# Patient Record
Sex: FEMALE | Race: WHITE | NOT HISPANIC OR LATINO | Employment: OTHER | URBAN - METROPOLITAN AREA
[De-identification: names, ages, dates, MRNs, and addresses within clinical notes are randomized per-mention and may not be internally consistent; named-entity substitution may affect disease eponyms.]

---

## 2017-01-06 DIAGNOSIS — E11.9 TYPE 2 DIABETES MELLITUS WITHOUT COMPLICATION: ICD-10-CM

## 2017-01-24 ENCOUNTER — TELEPHONE (OUTPATIENT)
Dept: FAMILY MEDICINE | Facility: CLINIC | Age: 82
End: 2017-01-24

## 2017-01-24 DIAGNOSIS — R26.81 UNSTEADY GAIT: Primary | ICD-10-CM

## 2017-01-24 NOTE — TELEPHONE ENCOUNTER
Pt dtr request a order for physical therapy sent to [facility]  Request it for balance and strengthening    Fax to [phone]

## 2017-01-24 NOTE — TELEPHONE ENCOUNTER
----- Message from Leanna Ford sent at 1/24/2017  9:21 AM CST -----  Cynthia, daughter, #[phone], is requesting to speak with the nurse concerning the pt's physical therapy.

## 2017-02-10 ENCOUNTER — PATIENT OUTREACH (OUTPATIENT)
Dept: ADMINISTRATIVE | Facility: HOSPITAL | Age: 82
End: 2017-02-10

## 2017-02-10 NOTE — LETTER
February 10, 2017    Gwen Durbin  [address]  [place] [address]             [facility]  [address]  [place] [address]  Phone: [phone] Dear Ms. Durbin:    [facility] is committed to your overall health.  To help you get the most out of each of your visits, we will review your information to make sure you are up to date on all of your recommended tests and/or procedures.      Nathalia Dunlap MD has found that you may be due for   Health Maintenance Due   Topic    Pneumococcal     Influenza Vaccine     Hemoglobin A1c     Eye Exam    If you have had any of the above done at another facility, please bring the records or information with you so that your record at [facility] will be complete.    If you are currently taking medication, please bring it with you to your appointment for review.    We will be happy to assist you with scheduling any necessary appointments or you may contact the [facility] appointment desk at [phone] to schedule at your convenience.     Thank you for choosing [facility] for your healthcare needs,      Jerilyn MELVIN LPN Care Coordinator  Care Coordination Department  [facility]

## 2017-02-22 ENCOUNTER — TELEPHONE (OUTPATIENT)
Dept: FAMILY MEDICINE | Facility: CLINIC | Age: 82
End: 2017-02-22

## 2017-02-22 NOTE — TELEPHONE ENCOUNTER
----- Message from Anne Anthony sent at 2/22/2017 11:11 AM CST -----  Contact: pt's daughter Cynthia- [phone]  Pt 's daughter Cynthia requests pt to be worked in around 9:00 am on 03/07/2017. She states when she called in previously to schedule, she told the agent not to schedule on a Friday and pt appt was scheduled for 02/24/17. Cynthia can be reached at [phone].

## 2017-02-23 NOTE — TELEPHONE ENCOUNTER
dtr states pt was booked for a physical in January by the phone staff and was told that she had a apt on a Tuesday.  States she got the apt letter in the mail and it was scheduled for a Friday and she spefically ask them not to make a apt on a Friday.  Pt states she wants to be worked in for a morning apt for a physical because she needs the patient to fast for her labs.  Offered a 1130 apt. Pt dtr refused. Wants to be see at the end of Feb or the beginning of March in the early morning.  Can urgent slot be used?

## 2017-02-23 NOTE — TELEPHONE ENCOUNTER
----- Message from Lorenza Lloyd sent at 2/23/2017 11:39 AM CST -----  Contact: loli - Cynthia Ford  [phone]   is returning a missed call - Please call again

## 2017-03-07 ENCOUNTER — OFFICE VISIT (OUTPATIENT)
Dept: FAMILY MEDICINE | Facility: CLINIC | Age: 82
End: 2017-03-07
Payer: MEDICARE

## 2017-03-07 ENCOUNTER — LAB VISIT (OUTPATIENT)
Dept: LAB | Facility: HOSPITAL | Age: 82
End: 2017-03-07
Attending: FAMILY MEDICINE
Payer: MEDICARE

## 2017-03-07 VITALS
BODY MASS INDEX: 24.46 KG/M2 | WEIGHT: 146.81 LBS | RESPIRATION RATE: 16 BRPM | HEART RATE: 69 BPM | OXYGEN SATURATION: 96 % | DIASTOLIC BLOOD PRESSURE: 70 MMHG | SYSTOLIC BLOOD PRESSURE: 110 MMHG | HEIGHT: 65 IN | TEMPERATURE: 97 F

## 2017-03-07 DIAGNOSIS — E11.69 HYPERLIPIDEMIA ASSOCIATED WITH TYPE 2 DIABETES MELLITUS: ICD-10-CM

## 2017-03-07 DIAGNOSIS — N18.30 CKD (CHRONIC KIDNEY DISEASE) STAGE 3, GFR 30-59 ML/MIN: ICD-10-CM

## 2017-03-07 DIAGNOSIS — M81.0 OSTEOPOROSIS, SENILE: ICD-10-CM

## 2017-03-07 DIAGNOSIS — I15.2 HYPERTENSION ASSOCIATED WITH DIABETES: ICD-10-CM

## 2017-03-07 DIAGNOSIS — H35.30 MACULAR DEGENERATION: ICD-10-CM

## 2017-03-07 DIAGNOSIS — E11.59 HYPERTENSION ASSOCIATED WITH DIABETES: ICD-10-CM

## 2017-03-07 DIAGNOSIS — E78.5 HYPERLIPIDEMIA ASSOCIATED WITH TYPE 2 DIABETES MELLITUS: ICD-10-CM

## 2017-03-07 DIAGNOSIS — F17.200 TOBACCO USE DISORDER: ICD-10-CM

## 2017-03-07 DIAGNOSIS — Z00.00 PREVENTATIVE HEALTH CARE: Primary | ICD-10-CM

## 2017-03-07 PROCEDURE — 99499 UNLISTED E&M SERVICE: CPT | Mod: S$GLB,,, | Performed by: FAMILY MEDICINE

## 2017-03-07 PROCEDURE — 82570 ASSAY OF URINE CREATININE: CPT

## 2017-03-07 PROCEDURE — 99397 PER PM REEVAL EST PAT 65+ YR: CPT | Mod: S$GLB,,, | Performed by: FAMILY MEDICINE

## 2017-03-07 PROCEDURE — 99999 PR PBB SHADOW E&M-EST. PATIENT-LVL III: CPT | Mod: PBBFAC,,, | Performed by: FAMILY MEDICINE

## 2017-03-07 NOTE — MR AVS SNAPSHOT
[facility]  [address] [address]  Phone: [phone]                  Gwen Durbin   3/7/2017 9:30 AM   Office Visit    Description:  Female : 10/6/1928   Provider:  Nathalia Dunlap MD   Department:  [facility] Medicine           Diagnoses this Visit        Comments    Preventative health care    -  Primary     Uncontrolled type 2 diabetes mellitus without complication, without long-term current use of insulin         CKD (chronic kidney disease) stage 3, GFR 30-59 ml/min         Hyperlipidemia associated with type 2 diabetes mellitus         Hypertension associated with diabetes         Uncontrolled secondary diabetes mellitus with stage 3 CKD (GFR 30-59)         Osteoporosis, senile         Macular degeneration                To Do List           Future Appointments        Provider Department Dept Phone    3/7/2017 10:20 AM SPECIMEN, [address] [facility] [phone]    3/7/2017 11:40 AM LABORATORY, [address] [facility] [phone]    3/14/2017 9:30 AM Nathalia Dunlap MD [facility] [phone]      Goals (5 Years of Data)     None      [facility] On [facility]     [facility] On Call Nurse Care Line -  Assistance  Registered nurses in the [facility] On Call Center provide clinical advisement, health education, appointment booking, and other advisory services.  Call for this free service at [phone].             Medications           Message regarding Medications     Verify the changes and/or additions to your medication regime listed below are the same as discussed with your clinician today.  If any of these changes or additions are incorrect, please notify your healthcare provider.             Verify that the below list of medications is an accurate representation of the medications you are currently taking.  If none reported, the list may be blank. If incorrect, please  "contact your healthcare provider. Carry this list with you in case of emergency.           Current Medications     blood sugar diagnostic (FREESTYLE INSULINX TEST STRIPS) Strp Check sugar 3 to 4 times daily.  Dx code:  E11.65    furosemide (LASIX) 20 MG tablet Take 1 tablet (20 mg total) by mouth 2 (two) times daily.    glimepiride (AMARYL) 2 MG tablet Take 1 tablet (2 mg total) by mouth before breakfast.    LANCETS & BLOOD GLUCOSE STRIPS MISC by Misc.(Non-Drug; Combo Route) route 2 (two) times daily.    lancets (FREESTYLE LANCETS) 28 gauge Misc 1 Units by Misc.(Non-Drug; Combo Route) route as directed. Check blood sugar 3 to 4 times daily.    metformin (GLUCOPHAGE) 500 MG tablet Take 1 tablet (500 mg total) by mouth 2 (two) times daily with meals.    potassium chloride (MICRO-K) 10 MEQ CpSR Take 1 capsule (10 mEq total) by mouth once daily.    simvastatin (ZOCOR) 40 MG tablet Take 1 tablet (40 mg total) by mouth every evening.    ergocalciferol (ERGOCALCIFEROL) 50,000 unit Cap Take 1 capsule (50,000 Units total) by mouth every 7 days.    fish oil-omega-3 fatty acids 300-1,000 mg capsule Take 2 g by mouth.    lisinopril 10 MG tablet Take 1 tablet (10 mg total) by mouth once daily.    meloxicam (MOBIC) 7.5 MG tablet Take 1 tablet (7.5 mg total) by mouth once daily.           Clinical Reference Information           Your Vitals Were     BP Pulse Temp Resp Height Weight    110/70 69 96.5 °F (35.8 °C) (Tympanic) 16 5' 4.5" (1.638 m) 66.6 kg (146 lb 13.2 oz)    SpO2 BMI             96% 24.81 kg/m2         Blood Pressure          Most Recent Value    BP  110/70      Allergies as of 3/7/2017     No Known Allergies      Immunizations Administered on Date of Encounter - 3/7/2017     None      Orders Placed During Today's Visit     Future Labs/Procedures Expected by Expires    CBC auto differential  3/7/2017 5/6/2018    Comprehensive metabolic panel  3/7/2017 5/6/2018    Lipid panel  3/7/2017 5/6/2018    " Microalbumin/creatinine urine ratio  3/7/2017 5/6/2018    TSH  3/7/2017 5/6/2018      [facility] Sign-Up     Activating your [facility] account is as easy as 1-2-3!     1) Visit [email or web address], select Sign Up Now, enter this activation code and your date of birth, then select Next.  [email or web address]  Expires: 4/21/2017 10:14 AM      2) Create a username and password to use when you visit [facility] in the future and select a security question in case you lose your password and select Next.    3) Enter your e-mail address and click Sign Up!    Additional Information  If you have questions, please e-mail [email or web address] or call [phone] to talk to our [facility] staff. Remember, [facility] is NOT to be used for urgent needs. For medical emergencies, dial 911.         Instructions      MIRALAX FOR CONSTIPATION       Smoking Cessation     If you would like to quit smoking:   You may be eligible for free services if you are a [place] resident and started smoking cigarettes before September 1, 1988.  Call the Smoking Cessation Trust ([facility]) toll free at (264) [phone] or (188) [phone].   Call 8-510-QUIT-NOW if you do not meet the above criteria.            Language Assistance Services     ATTENTION: Language assistance services are available, free of charge. Please call [phone].      ATENCIÓN: Si habla español, tiene a jordan disposición servicios gratuitos de asistencia lingüística. Llame al [phone].     [facility] Ý: N?u b?n nói Ti?ng Vi?t, có các d?ch v? h? tr? ngôn ng? mi?n phí dành cho b?n. G?i s? [phone].         [facility] complies with applicable Federal civil rights laws and does not discriminate on the basis of race, color, national origin, age, disability, or sex.

## 2017-03-07 NOTE — PROGRESS NOTES
CHIEF COMPLAINT: This is an 88-year-old female here for preventive health exam.    SUBJECTIVE: Patient is doing well without complaints except for intermittent constipation and diarrhea.  She occasionally uses Dulcolax.  She denies bright red blood per rectum.  Patient complains of painful right great toenail.  She denies redness, swelling or warmth.  She continues to live in assisted living at [facility]. She ambulates daily with her walker. Her eyesight continues to deteriorate due to macular degeneration. Her blood sugars are 140-160. She denies polyuria, polydipsia or polyphagia. Last A1c was 6.7%, 8 months ago. She complains of intermittent constipation and uses ducolax as needed. She denies blood in stool or melena. She continues to smoke one half pack of cigarettes per day and has done so for years.     Eye exam January 2017. Mammogram 2008. Colonoscopy May 2011. Bone DEXA scan July 2013 (patient refuses). Pneumovax May 2011. Flu vaccine October 2016. Prevnar April 2015.     ROS:  GENERAL: Patient denies fever, chills, night sweats. Patient denies weight gain. Patient denies anorexia, fatigue, weakness or swollen glands.  SKIN: Patient denies rash or hair loss.  HEENT: Patient denies sore throat, ear pain, hearing loss, nasal congestion, or runny nose. Patient denies visual disturbance, eye irritation or discharge.  LUNGS: Patient denies cough, wheeze or hemoptysis.  CARDIOVASCULAR: Patient denies chest pain, shortness of breath, palpitations, syncope or lower extremity edema.  GI: Patient denies abdominal pain, nausea, vomiting, diarrhea, blood in stool or melena.  GENITOURINARY: Patient denies pelvic pain, vaginal discharge, itch or odor. Patient denies irregular vaginal bleeding. Patient denies dysuria, frequency, hematuria, nocturia, urgency or incontinence.  BREASTS: Patient denies breast pain, mass or nipple discharge.  MUSCULOSKELETAL: Patient denies joint pain, swelling, redness or  warmth.  NEUROLOGIC: Patient denies headache, vertigo, paresthesias, weakness in limb, dysarthria, dysphagia or abnormality of gait.  PSYCHIATRIC: Patient denies anxiety, depression, or memory loss.     OBJECTIVE:   GENERAL: Well-developed well-nourished elderly white female alert and oriented x3, in no acute distress. Memory, judgment and cognition without deficit.  Weight loss of 3 pounds in the last year.  SKIN: Clear without rash. Normal color and tone.  HEENT: Eyes: Clear conjunctivae. No scleral icterus. Pupils equal reactive to light and accommodation. Ears: Clear canals. Clear TMs. Nose: Without congestion. Pharynx: Without injection or exudates.  NECK: Supple, normal range of motion. No masses, lymphadenopathy or enlarged thyroid. No JVD. Carotids 2+ and equal. No bruits.  LUNGS: Clear to auscultation. Normal respiratory effort.  CARDIOVASCULAR: Regular rhythm, normal S1, S2 without murmur, gallop or rub.  BACK: No CVA or spinal tenderness.  BREASTS: No masses, tenderness or nipple discharge.  ABDOMEN: Normal appearance. Active bowel sounds. Soft, nontender without mass or organomegaly. No rebound or guarding.  EXTREMITIES: Without cyanosis, clubbing. Trace ankle/pedal edema bilaterally. Distal pulses 2+ and equal. Normal range of motion in all extremities. No joint effusion, erythema or warmth.  NEUROLOGIC: Cranial nerves II through XII without deficit. Motor strength equal bilaterally. Sensation normal to touch. Deep tendon reflexes 2+ and equal. Gait unsteady without walker. No tremor. Negative cerebellar signs.   FOOT EVALUATION: 10 gram monofilament exam with protective sensation intact bilaterally. Nails appropriately trimmed. No ulcers. Distal pulses palpable.  Right great toenail ingrown medially with tenderness to palpation.  No paronychial erythema, fluctuance or warmth.  PELVIC: Deferred.  JUSTIN: No masses nontender heme-negative stool ×2.     ASSESSMENT:  1. Preventative health care    2.  Uncontrolled type 2 diabetes mellitus without complication, without long-term current use of insulin    3. CKD (chronic kidney disease) stage 3, GFR 30-59 ml/min    4. Hyperlipidemia associated with type 2 diabetes mellitus    5. Hypertension associated with diabetes    6. Uncontrolled secondary diabetes mellitus with stage 3 CKD (GFR 30-59)    7. Osteoporosis, senile    8. Macular degeneration    9. Tobacco use disorder      PLAN:   1.  Exercise regularly.  2.  Age-appropriate counseling.  3.  Fasting lab.  4.  Patient declines mammogram and bone DEXA scan.  5.  MiraLAX as needed for constipation.  6.  Return to clinic for removal of ingrown toenail.

## 2017-03-08 LAB
CREAT UR-MCNC: 277 MG/DL
MICROALBUMIN UR DL<=1MG/L-MCNC: 67 UG/ML
MICROALBUMIN/CREATININE RATIO: 24.2 UG/MG

## 2017-03-14 ENCOUNTER — OFFICE VISIT (OUTPATIENT)
Dept: FAMILY MEDICINE | Facility: CLINIC | Age: 82
End: 2017-03-14
Payer: MEDICARE

## 2017-03-14 VITALS
SYSTOLIC BLOOD PRESSURE: 122 MMHG | DIASTOLIC BLOOD PRESSURE: 78 MMHG | HEIGHT: 65 IN | RESPIRATION RATE: 18 BRPM | HEART RATE: 69 BPM | TEMPERATURE: 97 F | WEIGHT: 147.5 LBS | BODY MASS INDEX: 24.57 KG/M2

## 2017-03-14 DIAGNOSIS — L60.0 INGROWN TOENAIL: Primary | ICD-10-CM

## 2017-03-14 PROCEDURE — 1159F MED LIST DOCD IN RCRD: CPT | Mod: S$GLB,,, | Performed by: FAMILY MEDICINE

## 2017-03-14 PROCEDURE — 99212 OFFICE O/P EST SF 10 MIN: CPT | Mod: 25,S$GLB,, | Performed by: FAMILY MEDICINE

## 2017-03-14 PROCEDURE — 1157F ADVNC CARE PLAN IN RCRD: CPT | Mod: S$GLB,,, | Performed by: FAMILY MEDICINE

## 2017-03-14 PROCEDURE — 1160F RVW MEDS BY RX/DR IN RCRD: CPT | Mod: S$GLB,,, | Performed by: FAMILY MEDICINE

## 2017-03-14 PROCEDURE — 1126F AMNT PAIN NOTED NONE PRSNT: CPT | Mod: S$GLB,,, | Performed by: FAMILY MEDICINE

## 2017-03-14 PROCEDURE — 11730 AVULSION NAIL PLATE SIMPLE 1: CPT | Mod: S$GLB,,, | Performed by: FAMILY MEDICINE

## 2017-03-14 PROCEDURE — 99999 PR PBB SHADOW E&M-EST. PATIENT-LVL III: CPT | Mod: PBBFAC,,, | Performed by: FAMILY MEDICINE

## 2017-03-14 NOTE — PROGRESS NOTES
CHIEF COMPLAINT: This is a 88-year-old female complaining of ingrown toenail.    SUBJECTIVE: Patient complains of right ingrown toenail, lateral surface for days to weeks with pain when she puts on shoe and pain with ambulation.  She denies swelling, redness or warmth.  She denies fever, chills.    ROS:  GENERAL: Patient denies fever, chills, night sweats.  Patient denies weight gain or loss. Patient denies anorexia, fatigue, weakness or swollen glands.  SKIN: Patient denies rash.  LUNGS: Patient denies cough, wheeze or hemoptysis.  CARDIOVASCULAR: Patient denies chest pain, shortness of breath, palpitations, syncope or lower extremity edema.  GI: Patient denies abdominal pain, nausea, vomiting, diarrhea, constipation, blood in stool or melena.    OBJECTIVE:   GENERAL: Well-developed well-nourished elderly white female alert and oriented x3, in no acute distress. Memory, judgment and cognition without deficit.   SKIN: Clear without rash. Normal color and tone.  HEENT: Eyes: Clear conjunctivae. No scleral icterus.   NECK: Supple, normal range of motion. No masses, lymphadenopathy or enlarged thyroid. No JVD. Carotids 2+ and equal. No bruits.  LUNGS: Clear to auscultation. Normal respiratory effort.  CARDIOVASCULAR: Regular rhythm, normal S1, S2 without murmur, gallop or rub.  EXTREMITIES: Without cyanosis, clubbing. Trace ankle/pedal edema bilaterally. Distal pulses 2+ and equal. Normal range of motion in all extremities. No joint effusion, erythema or warmth.  Right great toenail ingrown medially with tenderness to palpation. No paronychial erythema, fluctuance or warmth.    Discussed potential complications of procedure including bleeding, infection, and injury to nerve.  Patient understands and accepts risks.  Verbal consent obtained.    Procedure: After sterile prep, right great toe anesthetized with 1% Xylocaine in digital block.  Toenail scored with #15 blade and bisected with nail clippers.  One third lateral  nail removed with hemostats.  Bleeding controlled.  Dressing placed.    ASSESSMENT:  1. Ingrown toenail      PLAN:   1.  Local care instructions.  2.  OTC analgesics as needed for pain.  3.  Follow-up if fever, chills, redness, swelling, or purulent discharge.

## 2017-05-08 DIAGNOSIS — Z00.00 PREVENTATIVE HEALTH CARE: ICD-10-CM

## 2017-05-08 DIAGNOSIS — E11.59 HYPERTENSION ASSOCIATED WITH DIABETES: ICD-10-CM

## 2017-05-08 DIAGNOSIS — M15.9 PRIMARY OSTEOARTHRITIS INVOLVING MULTIPLE JOINTS: ICD-10-CM

## 2017-05-08 DIAGNOSIS — I15.2 HYPERTENSION ASSOCIATED WITH DIABETES: ICD-10-CM

## 2017-05-08 DIAGNOSIS — E11.69 HYPERLIPIDEMIA ASSOCIATED WITH TYPE 2 DIABETES MELLITUS: ICD-10-CM

## 2017-05-08 DIAGNOSIS — N18.30 CKD (CHRONIC KIDNEY DISEASE) STAGE 3, GFR 30-59 ML/MIN: ICD-10-CM

## 2017-05-08 DIAGNOSIS — E78.5 HYPERLIPIDEMIA ASSOCIATED WITH TYPE 2 DIABETES MELLITUS: ICD-10-CM

## 2017-05-08 DIAGNOSIS — F17.200 TOBACCO USE DISORDER: ICD-10-CM

## 2017-05-08 DIAGNOSIS — H35.3290: ICD-10-CM

## 2017-05-08 NOTE — TELEPHONE ENCOUNTER
----- Message from Lidia Cleveland sent at 5/8/2017  4:20 PM CDT -----  Contact: Cynthia/daughter   Call caller regarding pt requesting a script for test scripts. Caller states that pt is almost out.   [phone]      ..  [facility] [address]

## 2017-05-11 ENCOUNTER — TELEPHONE (OUTPATIENT)
Dept: FAMILY MEDICINE | Facility: CLINIC | Age: 82
End: 2017-05-11

## 2017-05-11 RX ORDER — INSULIN PUMP SYRINGE, 3 ML
EACH MISCELLANEOUS
Qty: 1 EACH | Refills: 0 | Status: SHIPPED | OUTPATIENT
Start: 2017-05-11 | End: 2017-08-08

## 2017-05-11 NOTE — TELEPHONE ENCOUNTER
----- Message from Julia De La Rosa sent at 5/11/2017  9:11 AM CDT -----  Contact: Patients daughter, Cynthia Marie states that the test strips for her glucose, and insurance will not cover Freestyle, and needs another type of monitor and strips called in. Please call her back at [phone]. Thank you

## 2017-05-11 NOTE — TELEPHONE ENCOUNTER
----- Message from Lidia Cleveland sent at 5/11/2017  2:05 PM CDT -----  Contact: pt   Pt states that this is the name of glucose machine that her insurance will cover is In[employer]k Guide.. Pt states that she would like this machine called in to the pharmacy below.       ...[phone]      ..ÁNGELA HAYES

## 2017-06-23 ENCOUNTER — TELEPHONE (OUTPATIENT)
Dept: FAMILY MEDICINE | Facility: CLINIC | Age: 82
End: 2017-06-23

## 2017-06-23 RX ORDER — BUPROPION HYDROCHLORIDE 150 MG/1
TABLET, EXTENDED RELEASE ORAL
Qty: 60 TABLET | Refills: 5 | Status: SHIPPED | OUTPATIENT
Start: 2017-06-23 | End: 2017-08-08 | Stop reason: SDUPTHER

## 2017-06-23 NOTE — TELEPHONE ENCOUNTER
----- Message from Ariadne Vicente sent at 6/23/2017  8:20 AM CDT -----  Contact: Cynthiareshma Ford - daughter  Call her regarding patient's medication.  Call at [phone].                                                     briseno

## 2017-06-23 NOTE — TELEPHONE ENCOUNTER
Pt dtr states the facility where the pt stays told the pt she needed to stop smoking  dtr wants to know if you would order wellbutrin to help her with this

## 2017-07-03 ENCOUNTER — TELEPHONE (OUTPATIENT)
Dept: FAMILY MEDICINE | Facility: CLINIC | Age: 82
End: 2017-07-03

## 2017-07-03 NOTE — TELEPHONE ENCOUNTER
----- Message from Danuta Paz sent at 7/3/2017 11:27 AM CDT -----  Contact: Bethany/[facility]  Bethany called to speak with the nurse about a fax to get signed regarding therapy orders. They have been sending since March and never received back.    Fax number: [phone]    She can be contacted at [phone]    Thanks,  Danuta

## 2017-08-08 ENCOUNTER — OFFICE VISIT (OUTPATIENT)
Dept: FAMILY MEDICINE | Facility: CLINIC | Age: 82
End: 2017-08-08
Payer: MEDICARE

## 2017-08-08 VITALS
DIASTOLIC BLOOD PRESSURE: 68 MMHG | HEIGHT: 65 IN | OXYGEN SATURATION: 97 % | SYSTOLIC BLOOD PRESSURE: 118 MMHG | BODY MASS INDEX: 22.77 KG/M2 | HEART RATE: 60 BPM | WEIGHT: 136.69 LBS

## 2017-08-08 DIAGNOSIS — H91.93 BILATERAL HEARING LOSS, UNSPECIFIED HEARING LOSS TYPE: ICD-10-CM

## 2017-08-08 DIAGNOSIS — F17.200 TOBACCO USE DISORDER: ICD-10-CM

## 2017-08-08 DIAGNOSIS — E11.59 HYPERTENSION ASSOCIATED WITH DIABETES: ICD-10-CM

## 2017-08-08 DIAGNOSIS — N18.30 CKD (CHRONIC KIDNEY DISEASE) STAGE 3, GFR 30-59 ML/MIN: ICD-10-CM

## 2017-08-08 DIAGNOSIS — E55.9 VITAMIN D DEFICIENCY: ICD-10-CM

## 2017-08-08 DIAGNOSIS — E78.5 HYPERLIPIDEMIA ASSOCIATED WITH TYPE 2 DIABETES MELLITUS: ICD-10-CM

## 2017-08-08 DIAGNOSIS — E11.69 HYPERLIPIDEMIA ASSOCIATED WITH TYPE 2 DIABETES MELLITUS: ICD-10-CM

## 2017-08-08 DIAGNOSIS — E78.5 DYSLIPIDEMIA: ICD-10-CM

## 2017-08-08 DIAGNOSIS — Z00.00 ENCOUNTER FOR PREVENTIVE HEALTH EXAMINATION: Primary | ICD-10-CM

## 2017-08-08 DIAGNOSIS — N18.30 CONTROLLED TYPE 2 DIABETES MELLITUS WITH STAGE 3 CHRONIC KIDNEY DISEASE, WITHOUT LONG-TERM CURRENT USE OF INSULIN: ICD-10-CM

## 2017-08-08 DIAGNOSIS — M81.0 OSTEOPOROSIS, SENILE: ICD-10-CM

## 2017-08-08 DIAGNOSIS — H35.3290: ICD-10-CM

## 2017-08-08 DIAGNOSIS — R26.81 GAIT INSTABILITY: ICD-10-CM

## 2017-08-08 DIAGNOSIS — R60.0 LEG EDEMA: ICD-10-CM

## 2017-08-08 DIAGNOSIS — M15.9 PRIMARY OSTEOARTHRITIS INVOLVING MULTIPLE JOINTS: ICD-10-CM

## 2017-08-08 DIAGNOSIS — R60.0 LOWER EXTREMITY EDEMA: ICD-10-CM

## 2017-08-08 DIAGNOSIS — I15.2 HYPERTENSION ASSOCIATED WITH DIABETES: ICD-10-CM

## 2017-08-08 DIAGNOSIS — E11.22 CONTROLLED TYPE 2 DIABETES MELLITUS WITH STAGE 3 CHRONIC KIDNEY DISEASE, WITHOUT LONG-TERM CURRENT USE OF INSULIN: ICD-10-CM

## 2017-08-08 DIAGNOSIS — M54.9 MID BACK PAIN: ICD-10-CM

## 2017-08-08 PROCEDURE — 99999 PR PBB SHADOW E&M-EST. PATIENT-LVL IV: CPT | Mod: PBBFAC,,, | Performed by: NURSE PRACTITIONER

## 2017-08-08 PROCEDURE — G0439 PPPS, SUBSEQ VISIT: HCPCS | Mod: S$GLB,,, | Performed by: NURSE PRACTITIONER

## 2017-08-08 PROCEDURE — 99499 UNLISTED E&M SERVICE: CPT | Mod: S$GLB,,, | Performed by: NURSE PRACTITIONER

## 2017-08-08 RX ORDER — POTASSIUM CHLORIDE 750 MG/1
10 CAPSULE, EXTENDED RELEASE ORAL DAILY
Qty: 30 CAPSULE | Refills: 6 | Status: SHIPPED | OUTPATIENT
Start: 2017-08-08 | End: 2018-01-08 | Stop reason: SDUPTHER

## 2017-08-08 RX ORDER — FUROSEMIDE 20 MG/1
20 TABLET ORAL 2 TIMES DAILY
Qty: 30 TABLET | Refills: 6 | Status: SHIPPED | OUTPATIENT
Start: 2017-08-08 | End: 2017-10-21 | Stop reason: SDUPTHER

## 2017-08-08 RX ORDER — LISINOPRIL 10 MG/1
10 TABLET ORAL DAILY
Qty: 30 TABLET | Refills: 6 | Status: SHIPPED | OUTPATIENT
Start: 2017-08-08 | End: 2018-01-19 | Stop reason: SDUPTHER

## 2017-08-08 RX ORDER — BUPROPION HYDROCHLORIDE 150 MG/1
TABLET, EXTENDED RELEASE ORAL
Qty: 60 TABLET | Refills: 6 | Status: SHIPPED | OUTPATIENT
Start: 2017-08-08 | End: 2018-01-11 | Stop reason: SDUPTHER

## 2017-08-08 RX ORDER — MELOXICAM 7.5 MG/1
7.5 TABLET ORAL DAILY
Qty: 30 TABLET | Refills: 6 | Status: SHIPPED | OUTPATIENT
Start: 2017-08-08 | End: 2018-11-08

## 2017-08-08 RX ORDER — ERGOCALCIFEROL 1.25 MG/1
50000 CAPSULE ORAL
Qty: 4 CAPSULE | Refills: 6 | Status: SHIPPED | OUTPATIENT
Start: 2017-08-08 | End: 2018-11-08

## 2017-08-08 RX ORDER — SIMVASTATIN 40 MG/1
40 TABLET, FILM COATED ORAL NIGHTLY
Qty: 30 TABLET | Refills: 6 | Status: SHIPPED | OUTPATIENT
Start: 2017-08-08 | End: 2018-04-17 | Stop reason: SDUPTHER

## 2017-08-08 RX ORDER — GLIMEPIRIDE 2 MG/1
2 TABLET ORAL
Qty: 30 TABLET | Refills: 6 | Status: SHIPPED | OUTPATIENT
Start: 2017-08-08 | End: 2018-01-03 | Stop reason: SDUPTHER

## 2017-08-08 RX ORDER — METFORMIN HYDROCHLORIDE 500 MG/1
500 TABLET ORAL 2 TIMES DAILY WITH MEALS
Qty: 60 TABLET | Refills: 6 | Status: SHIPPED | OUTPATIENT
Start: 2017-08-08 | End: 2017-10-17

## 2017-08-08 NOTE — Clinical Note
Your patient was seen today for a HRA visit.  I have included a copy of my visit note, please review the note and feel free to contact me with any questions.  Thank you for allowing me to participate in the care of your patients.  Stacey Ford NP

## 2017-08-08 NOTE — PROGRESS NOTES
"Gwen Durbin presented for a  Medicare AWV and comprehensive Health Risk Assessment today. The following components were reviewed and updated:    · Medical history  · Family History  · Social history  · Allergies and Current Medications  · Health Risk Assessment  · Health Maintenance  · Care Team     ** See Completed Assessments for Annual Wellness Visit within the encounter summary.**       The following assessments were completed:  · Living Situation  · CAGE  · Depression Screening  · Timed Get Up and Go  · Whisper Test  · Cognitive Function Screening  · Nutrition Screening  · ADL Screening  · PAQ Screening    Vitals:    08/08/17 0901   BP: 118/68   BP Location: Left arm   Patient Position: Sitting   Pulse: 60   SpO2: 97%   Weight: 62 kg (136 lb 11 oz)   Height: 5' 4.5" (1.638 m)     Body mass index is 23.1 kg/m².  Physical Exam   Constitutional: She appears well-developed.   HENT:   Head: Normocephalic and atraumatic.   Eyes: Pupils are equal, round, and reactive to light.   Neck: Carotid bruit is not present.   Cardiovascular: Normal rate, regular rhythm, normal heart sounds, intact distal pulses and normal pulses.  Exam reveals no gallop.    No murmur heard.  Pulmonary/Chest: Effort normal and breath sounds normal.   Abdominal: Soft. Normal appearance and bowel sounds are normal. She exhibits no distension. There is no tenderness.   Musculoskeletal: Normal range of motion. She exhibits no edema or tenderness.   Neurological: She is alert. She exhibits normal muscle tone.   Sensory exam of the feet is normal, tested with the monofilament.     Ambulates with rollator   Skin: Skin is warm, dry and intact.   Psychiatric: She has a normal mood and affect. Her speech is normal and behavior is normal. Judgment and thought content normal. Cognition and memory are normal.   Nursing note and vitals reviewed.        Diagnoses and health risks identified today and associated recommendations/orders:    1. Encounter for " preventive health examination    2 Controlled type 2 diabetes mellitus with stage 3 chronic kidney disease, without long-term current use of insulin  Component      Latest Ref Rng & Units 3/7/2017 6/14/2016   Hemoglobin A1C      4.5 - 6.2 % 6.7 (H) 6.7 (H)   Stable and controlled on Glucophage and Amaryl daily  . Continue current treatment plan as previously prescribed with your PCP- Ivanna       3. CKD (chronic kidney disease) stage 3, GFR 30-59 ml/min  Component      Latest Ref Rng & Units 3/7/2017 6/14/2016 3/1/2016   eGFR if non African American      >60 mL/min/1.73 m:2 44.9 (A) 57.7 (A) 57.7 (A)   Stable and controlled. Continue current treatment plan as previously prescribed with your PCP.       4. Hyperlipidemia associated with type 2 diabetes mellitus    Component      Latest Ref Rng & Units 3/7/2017   Cholesterol      120 - 199 mg/dL 136   Triglycerides      30 - 150 mg/dL 146   HDL      40 - 75 mg/dL 42   LDL Cholesterol      63.0 - 159.0 mg/dL 64.8   HDL/Chol Ratio      20.0 - 50.0 % 30.9   Total Cholesterol/HDL Ratio      2.0 - 5.0 3.2   Stable and controlled on Zocor daily . Continue current treatment plan as previously prescribed with your PCP.       5. Hypertension associated with diabetes  Stable and controlled on Lisinopril daily . Continue current treatment plan as previously prescribed with your PCP.     6. Osteoporosis, senile  Chronic and stable - denies recent falls or injuries   last DEXA 2013 and decline additional DEXA. Takes vitamin D and calcium . Followed by outside rheuma logist, Dr. Caal.     7. Tobacco use disorder  Pt reports she smokes .5 ppd cigarettes daily and doesn't want to stopped   pt on long term of smoking and smoking cessation program- refused at this time   This problem is currently not controlled. Please follow up with your PCP as planned to discuss     8. Bilateral hearing loss, unspecified hearing loss type  This problem is currently not controlled. Pt states  "she does not wear hearing aides " It's too much trouble to put on " Please follow up with your PCP as planned to discuss adjustments to your treatment plan.    9. Exudative macular degeneration  Chronic and stable- states decrease vision but has not worsen . Followed by optholomogist.    10. Gait instability  Stable and controlled with a Rolator daily, goes to PT 2 twice week- denies recent falls or injuries . Continue current treatment plan as previously prescribed with your PCP and therapy       11. Primary osteoarthritis involving multiple joints  Stable and controlled on Mobic daily . Continue current treatment plan as previously prescribed with your PCP.     12. Vitamin D deficiency  Stable and controlled on Vitamin D 469900 weekly . Continue current treatment plan as previously prescribed with your PCP.      13.Lower extremity edema  Stable and controlled on Lasix and potassium daily. Continue current treatment plan as previously prescribed with your PCP.     Pt unable to draw clock  But memory intact.    Provided Gwen with a 5-10 year written screening schedule and personal prevention plan. Recommendations were developed using the USPSTF age appropriate recommendations. Education, counseling, and referrals were provided as needed. After Visit Summary printed and given to patient which includes a list of additional screenings\tests needed.    1 year follow up   Stacey Ford NP     "

## 2017-08-08 NOTE — TELEPHONE ENCOUNTER
----- Message from Lisa Collier MA sent at 8/8/2017  9:19 AM CDT -----  Pt states been having swelling around ankles would like refill of furosemide sent to [facility].. Thank you

## 2017-08-08 NOTE — TELEPHONE ENCOUNTER
----- Message from Lisa Collier MA sent at 8/8/2017 10:30 AM CDT -----  Pt is now requesting all meds need refills and to send to [facility] please. Thank you

## 2017-08-08 NOTE — PATIENT INSTRUCTIONS
Counseling and Referral of Other Preventative  (Italic type indicates deductible and co-insurance are waived)    Patient Name: Gwen Durbin  Today's Date: 8/8/2017      SERVICE LIMITATIONS RECOMMENDATION    Vaccines    · Pneumococcal (once after 65)    · Influenza (annually)    · Hepatitis B (if medium/high risk)    · Prevnar 13      Hepatitis B medium/high risk factors:       - End-stage renal disease       - Hemophiliacs who received Factor VII or         IX concentrates       - Clients of institutions for the mentally             retarded       - Persons who live in the same house as          a HepB carrier       - Homosexual men       - Illicit injectable drug abusers     Pneumococcal: Done, no repeat necessary     Influenza: Done, repeat in one year     Hepatitis B: N/A     Prevnar 13: Done, no repeat necessary    Mammogram (biennial age 50-74)  Annually (age 40 or over)  N/A    Pap (up to age 70 and after 70 if unknown history or abnormal study last 10 years)    N/A     The USPSTF recommends against screening for cervical cancer in women who have had a hysterectomy with removal of the cervix and who do not have a history of a high-grade precancerous lesion (cervical intraepithelial neoplasia [CASSANDRA] grade 2 or 3) or cervical cancer.     Colorectal cancer screening (to age 75)    · Fecal occult blood test (annual)  · Flexible sigmoidoscopy (5y)  · Screening colonoscopy (10y)  · Barium enema   N/A    Diabetes self-management training (no USPSTF recommendations)  Requires referral by treating physician for patient with diabetes or renal disease. 10 hours of initial DSMT sessions of no less than 30 minutes each in a continuous 12-month period. 2 hours of follow-up DSMT in subsequent years.  Recommended to patient, declined    Bone mass measurements (age 65 & older, biennial)  Requires diagnosis related to osteoporosis or estrogen deficiency. Biennial benefit unless patient has history of long-term glucocorticoid   Done this year, repeat every year    Glaucoma screening (no USPSTF recommendation)  Diabetes mellitus, family history   , age 50 or over    American, age 65 or over  Scheduled, see appointments    Medical nutrition therapy for diabetes or renal disease (no recommended schedule)  Requires referral by treating physician for patient with diabetes or renal disease or kidney transplant within the past 3 years.  Can be provided in same year as diabetes self-management training (DSMT), and CMS recommends medical nutrition therapy take place after DSMT. Up to 3 hours for initial year and 2 hours in subsequent years.  Recommended to patient, declined    Cardiovascular screening blood tests (every 5 years)  · Fasting lipid panel  Order as a panel if possible  Done this year, repeat every year    Diabetes screening tests (at least every 3 years, Medicare covers annually or at 6-month intervals for prediabetic patients)  · Fasting blood sugar (FBS) or glucose tolerance test (GTT)  Patient must be diagnosed with one of the following:       - Hypertension       - Dyslipidemia       - Obesity (BMI 30kg/m2)       - Previous elevated impaired FBS or GTT       ... or any two of the following:       - Overweight (BMI 25 but <30)       - Family history of diabetes       - Age 65 or older       - History of gestational diabetes or birth of baby weighing more than 9 pounds  Done this year, repeat every year    HIV screening (annually for increased risk patients)  · HIV-1 and HIV-2 by EIA, or GAYLA, rapid antibody test or oral mucosa transudate  Patients must be at increased risk for HIV infection per USPSTF guidelines or pregnant. Tests covered annually for patient at increased risk or as requested by the patient. Pregnant patients may receive up to 3 tests during pregnancy.  Risks discussed, screening is not recommended    Smoking cessation counseling (up to 8 sessions per year)  Patients must be asymptomatic of  tobacco-related conditions to receive as a preventative service.  Counseled for 3-10 minutes.    Subsequent annual wellness visit  At least 12 months since last AWV  Return in one year     The following information is provided to all patients.  This information is to help you find resources for any of the problems found today that may be affecting your health:                Living healthy guide: www.[facility].[place].[facility]      Understanding Diabetes: www.diabetes.org      Eating healthy: www.cdc.gov/healthyweight      CDC home safety checklist: www.cdc.gov/steadi/patient.html      Agency on Aging: www.goea.[place].[facility]      Alcoholics anonymous (AA): www.aa.org      Physical Activity: www.tello.nih.gov/bm9orfb      Tobacco use: www.quitwithusla.org

## 2017-10-17 ENCOUNTER — LAB VISIT (OUTPATIENT)
Dept: LAB | Facility: HOSPITAL | Age: 82
End: 2017-10-17
Attending: FAMILY MEDICINE
Payer: MEDICARE

## 2017-10-17 ENCOUNTER — OFFICE VISIT (OUTPATIENT)
Dept: FAMILY MEDICINE | Facility: CLINIC | Age: 82
End: 2017-10-17
Payer: MEDICARE

## 2017-10-17 VITALS
WEIGHT: 143.75 LBS | TEMPERATURE: 97 F | SYSTOLIC BLOOD PRESSURE: 110 MMHG | BODY MASS INDEX: 23.95 KG/M2 | HEIGHT: 65 IN | HEART RATE: 72 BPM | OXYGEN SATURATION: 96 % | DIASTOLIC BLOOD PRESSURE: 68 MMHG | RESPIRATION RATE: 18 BRPM

## 2017-10-17 DIAGNOSIS — R15.9 INCONTINENCE OF FECES, UNSPECIFIED FECAL INCONTINENCE TYPE: Primary | ICD-10-CM

## 2017-10-17 DIAGNOSIS — R26.81 GAIT INSTABILITY: ICD-10-CM

## 2017-10-17 DIAGNOSIS — R19.7 DIARRHEA, UNSPECIFIED TYPE: ICD-10-CM

## 2017-10-17 LAB — GLUCOSE SERPL-MCNC: 176 MG/DL (ref 70–110)

## 2017-10-17 PROCEDURE — 36415 COLL VENOUS BLD VENIPUNCTURE: CPT | Mod: PO

## 2017-10-17 PROCEDURE — 82948 REAGENT STRIP/BLOOD GLUCOSE: CPT | Mod: S$GLB,,, | Performed by: FAMILY MEDICINE

## 2017-10-17 PROCEDURE — G0008 ADMIN INFLUENZA VIRUS VAC: HCPCS | Mod: S$GLB,,, | Performed by: FAMILY MEDICINE

## 2017-10-17 PROCEDURE — 90662 IIV NO PRSV INCREASED AG IM: CPT | Mod: S$GLB,,, | Performed by: FAMILY MEDICINE

## 2017-10-17 PROCEDURE — 99214 OFFICE O/P EST MOD 30 MIN: CPT | Mod: 25,S$GLB,, | Performed by: FAMILY MEDICINE

## 2017-10-17 PROCEDURE — 99499 UNLISTED E&M SERVICE: CPT | Mod: S$GLB,,, | Performed by: FAMILY MEDICINE

## 2017-10-17 PROCEDURE — 83036 HEMOGLOBIN GLYCOSYLATED A1C: CPT

## 2017-10-17 PROCEDURE — 99999 PR PBB SHADOW E&M-EST. PATIENT-LVL IV: CPT | Mod: PBBFAC,,, | Performed by: FAMILY MEDICINE

## 2017-10-17 NOTE — PROGRESS NOTES
CHIEF COMPLAINT: This is a 89-year-old female complaining of bowel incontinence.    SUBJECTIVE: Patient is brought in today by her daughter.  She's been having problems with her bowels for over one year.  However, over the last several months she's had several episodes of fecal incontinence which has restricted her ability to attend classes and hair appointments.  She lives at [facility] in an independent apartment.  Her family is concerned that facility may insist that she be moved to a assisted living apartment.  Patient reports that she often has a regular bowel movement but then without any warning has profuse liquidy stool.  She denies bright red blood or melena.  Patient denies associated nausea, vomiting, or abdominal pain..  She states that if she goes for 3 days without bowel movement, she takes Dulcolax.  Her daughter is frustrated with her diet.  She believes she is eating the wrong kinds of food and takes too many supplements.    Patient has type II diabetic.  She takes metformin 500 mg twice daily and glimepiride 2 mg with breakfast.  She has been unable to monitor her blood sugar because of her eyesight.  She reports it's difficult to fit glucose strips into meter.  Patient denies polyuria, polydipsia, polyphagia.  Last A1c was 6.7% 7 months ago.  Patient's gait is steady but patient denies any recent falls.    ROS:  GENERAL: Patient denies fever, chills, night sweats. Patient denies weight gain. Patient denies anorexia, fatigue, weakness or swollen glands.  SKIN: Patient denies rash or hair loss.  HEENT: Patient denies sore throat, ear pain, hearing loss, nasal congestion, or runny nose. Patient denies visual disturbance, eye irritation or discharge.  LUNGS: Patient denies cough, wheeze or hemoptysis.  CARDIOVASCULAR: Patient denies chest pain, shortness of breath, palpitations, syncope or lower extremity edema.  GI: As above.  GENITOURINARY: Patient denies pelvic pain, vaginal discharge, itch or  odor. Patient denies irregular vaginal bleeding. Patient denies dysuria, frequency, hematuria, nocturia, urgency or incontinence.  MUSCULOSKELETAL: Patient denies joint pain, swelling, redness or warmth.  NEUROLOGIC: Patient denies headache, vertigo, paresthesias, weakness in limb, dysarthria, dysphagia or abnormality of gait.  PSYCHIATRIC: Patient denies anxiety, depression, or memory loss.     OBJECTIVE:   GENERAL: Well-developed well-nourished elderly white female alert and oriented x3, in no acute distress. Memory, judgment and cognition without deficit.    SKIN: Clear without rash. Normal color and tone.  HEENT: Eyes: Clear conjunctivae. No scleral icterus.   NECK: Supple, normal range of motion. No masses, lymphadenopathy or enlarged thyroid. No JVD. Carotids 2+ and equal. No bruits.  LUNGS: Clear to auscultation. Normal respiratory effort.  CARDIOVASCULAR: Regular rhythm, normal S1, S2 without murmur, gallop or rub.  BACK: No CVA or spinal tenderness.  ABDOMEN: Normal appearance. Active bowel sounds. Soft, nontender without mass or organomegaly. No rebound or guarding.  EXTREMITIES: Without cyanosis, clubbing. Trace ankle/pedal edema bilaterally. Distal pulses 2+ and equal. Normal range of motion in all extremities. No joint effusion, erythema or warmth.  NEUROLOGIC:  Motor strength equal bilaterally. Sensation normal to touch. Gait unsteady without walker. No tremor.     Random Accu-Chek = 176.    Lengthy discussion with patient regarding good bowel hygiene.  Reviewed multiple supplements which were brought today.  Patient to stop all supplements except vitamin D3, CoQ10 and multivitamin.       ASSESSMENT:  1. Incontinence of feces, unspecified fecal incontinence type    2. Diarrhea, unspecified type    3. Uncontrolled type 2 diabetes mellitus without complication, without long-term current use of insulin    4. Gait instability      PLAN:   1.  Discontinue metformin.  2.  Patient will need to monitor blood  sugar frequently to assess any change in blood sugar related to discontinuing metformin.  3.  Home health consult.  4.  Check A1c.  5.  Influenza vaccine.  6.  May use Imodium prior to social events if necessary.  7.  Discontinue use of Dulcolax.  8.  Follow-up if no improvement or worsening symptoms.    This was a 30 minute appointment, greater than 50% of which involved counseling.

## 2017-10-18 LAB
ESTIMATED AVG GLUCOSE: 143 MG/DL
HBA1C MFR BLD HPLC: 6.6 %

## 2017-10-19 ENCOUNTER — TELEPHONE (OUTPATIENT)
Dept: FAMILY MEDICINE | Facility: CLINIC | Age: 82
End: 2017-10-19

## 2017-10-19 NOTE — TELEPHONE ENCOUNTER
----- Message from Lidia Cleveland sent at 10/19/2017 10:17 AM CDT -----  Contact: cayla/[facility]   Call caller regarding orders that was received on yesterday.   [phone]

## 2017-10-23 RX ORDER — FUROSEMIDE 20 MG/1
TABLET ORAL
Qty: 180 TABLET | Refills: 1 | Status: SHIPPED | OUTPATIENT
Start: 2017-10-23 | End: 2018-05-03 | Stop reason: SDUPTHER

## 2017-10-26 ENCOUNTER — TELEPHONE (OUTPATIENT)
Dept: FAMILY MEDICINE | Facility: CLINIC | Age: 82
End: 2017-10-26

## 2017-10-26 NOTE — TELEPHONE ENCOUNTER
----- Message from Zina Robb sent at 10/26/2017 11:21 AM CDT -----  Contact: [facility]- [phone]  Would like to consult with nurse about pt home visit. States pt non compliant with medication due to cognitive limitations. Please call bk at [phone]. thx lj

## 2017-10-26 NOTE — TELEPHONE ENCOUNTER
We need postprandial blood sugars also.  Either taken prior to lunch or dinner or 2 hours after a meal.

## 2017-10-26 NOTE — TELEPHONE ENCOUNTER
Blood sugars are taken in the morning before meals   Notified home health that pt is suppose to take glimepiride bid

## 2017-10-26 NOTE — TELEPHONE ENCOUNTER
----- Message from Danuta Paz sent at 10/26/2017 11:54 AM CDT -----  Contact: Edilma/[facility]  Edilma returned call to nurse. She can be contacted at [phone].    Thanks,  Danuta

## 2017-10-26 NOTE — TELEPHONE ENCOUNTER
The patient is right.  She is supposed to stop the metformin.  She is supposed to take glimepiride  twice a day.  At what time of day were those blood sugars readings taken ?

## 2017-10-26 NOTE — TELEPHONE ENCOUNTER
charleen with home health   Wants you to know pt in noncompliant with taking medications  States she is confused about what medication she is taking   Not sure if she has taken medications or not every day  Thought she was suppose to be taking glimepiride twice a day since she stopped taking the metformin.  Refused to use a pill planner  Home health states they will talk to family about paying for a extra service to have medication dispensed to her at the assited living she is in.     Tuesday Wednesday

## 2017-11-28 ENCOUNTER — TELEPHONE (OUTPATIENT)
Dept: FAMILY MEDICINE | Facility: CLINIC | Age: 82
End: 2017-11-28

## 2017-11-28 DIAGNOSIS — R19.7 DIARRHEA, UNSPECIFIED TYPE: Primary | ICD-10-CM

## 2017-11-28 NOTE — TELEPHONE ENCOUNTER
----- Message from Hilaria Hills sent at 11/28/2017 12:17 PM CST -----  Contact: pt   Pt daughter Snehal,,, calling about the contact with [facility] and to see of information has been sent over or what can they do to help the process,,, please give pt daughter Snehal a call  [phone]

## 2017-11-28 NOTE — TELEPHONE ENCOUNTER
----- Message from Hyun Sheehan sent at 11/28/2017 10:15 AM CST -----  Contact: bryanna-daughter  needs callback rg home health, meds, etc...[phone]

## 2017-11-28 NOTE — TELEPHONE ENCOUNTER
----- Message from Shira Dc sent at 11/28/2017 11:04 AM CST -----  Contact:  [facility] Lisa Pablo is calling nurse staff regarding a request for current list of medications. The patient is requesting management to be done by the staff at [facility] now. Please fax the list of meds to [phone] and the phone [phone]  thanks

## 2017-11-28 NOTE — TELEPHONE ENCOUNTER
----- Message from Soco Fernandez sent at 11/28/2017 10:32 AM CST -----  [phone]  Would like to consult with nurse regarding orders for stool sample.  Please call back Juan at [phone].  Md Rachael

## 2017-11-28 NOTE — TELEPHONE ENCOUNTER
[facility] d[facility]  Also states she spoke with you over the weekend and was told she needed a stool specimen  Need order to give to home health

## 2017-12-01 ENCOUNTER — TELEPHONE (OUTPATIENT)
Dept: FAMILY MEDICINE | Facility: CLINIC | Age: 82
End: 2017-12-01

## 2017-12-01 NOTE — TELEPHONE ENCOUNTER
----- Message from Pippa Asencio sent at 12/1/2017  8:04 AM CST -----  Contact: pt daughter - bryanna   States she's calling to get pt's stool sample results and also discuss some meds and can be reached at [phone]/[email or web address]

## 2017-12-01 NOTE — TELEPHONE ENCOUNTER
Home health states she pt is not having any diarrhea  States the nurse the patient and the facility states the patient is not having any diarrhea   States she is having formed normal stools  States the dtr is saying she has diarrhea

## 2017-12-01 NOTE — TELEPHONE ENCOUNTER
----- Message from Ashley Figueroa sent at 12/1/2017 11:09 AM CST -----  Contact[facility] called in regards to pt does not have a stool sample. No callback needed.

## 2017-12-01 NOTE — TELEPHONE ENCOUNTER
Pt's daughter states that pt is having uncontrollable diarrhea and wants to know if she can be recommended to GI today or if she should come in to see you today bc the nursing home doesn't want the pt to go the whole weekend with this condition.

## 2017-12-04 ENCOUNTER — HOSPITAL ENCOUNTER (OUTPATIENT)
Dept: RADIOLOGY | Facility: HOSPITAL | Age: 82
Discharge: HOME | End: 2017-12-04
Attending: NURSE PRACTITIONER
Payer: MEDICARE

## 2017-12-04 ENCOUNTER — INITIAL CONSULT (OUTPATIENT)
Dept: GASTROENTEROLOGY | Facility: CLINIC | Age: 82
End: 2017-12-04
Payer: MEDICARE

## 2017-12-04 VITALS
BODY MASS INDEX: 24.65 KG/M2 | HEART RATE: 60 BPM | HEIGHT: 64 IN | SYSTOLIC BLOOD PRESSURE: 136 MMHG | DIASTOLIC BLOOD PRESSURE: 56 MMHG | WEIGHT: 144.38 LBS

## 2017-12-04 DIAGNOSIS — K59.00 CONSTIPATION, UNSPECIFIED CONSTIPATION TYPE: ICD-10-CM

## 2017-12-04 DIAGNOSIS — R19.7 DIARRHEA, UNSPECIFIED TYPE: Primary | ICD-10-CM

## 2017-12-04 DIAGNOSIS — R19.7 DIARRHEA, UNSPECIFIED TYPE: ICD-10-CM

## 2017-12-04 PROCEDURE — 99999 PR PBB SHADOW E&M-EST. PATIENT-LVL III: CPT | Mod: PBBFAC,,, | Performed by: NURSE PRACTITIONER

## 2017-12-04 PROCEDURE — 74020 XR ABDOMEN FLAT AND ERECT: CPT | Mod: TC

## 2017-12-04 PROCEDURE — 74020 XR ABDOMEN FLAT AND ERECT: CPT | Mod: 26,,, | Performed by: RADIOLOGY

## 2017-12-04 PROCEDURE — 99204 OFFICE O/P NEW MOD 45 MIN: CPT | Mod: S$GLB,,, | Performed by: NURSE PRACTITIONER

## 2017-12-04 NOTE — LETTER
December 9, 2017      Nathalia Dunlap MD  [address]  [facility] [address]           [facility]  [address] [place] [address]  Phone: [phone]  Fax: [phone]          Patient: Gwen Durbin   MR Number: [id number]   YOB: 1928   Date of Visit: 12/4/2017       Dear Dr. Nathalia Dunlap:    Thank you for referring Gwen Durbin to me for evaluation. Attached you will find relevant portions of my assessment and plan of care.    If you have questions, please do not hesitate to call me. I look forward to following Gwen Durbin along with you.    Sincerely,    Gloria Roy, NP    Enclosure  CC:  No Recipients    If you would like to receive this communication electronically, please contact [email or web address] or [phone] to request more information on [facility] access.    For providers and/or their staff who would like to refer a patient to [facility], please contact us through our one-stop-shop provider referral line, [facility], at [phone].    If you feel you have received this communication in error or would no longer like to receive these types of communications, please e-mail [email or web address]

## 2017-12-06 ENCOUNTER — TELEPHONE (OUTPATIENT)
Dept: GASTROENTEROLOGY | Facility: CLINIC | Age: 82
End: 2017-12-06

## 2017-12-06 NOTE — TELEPHONE ENCOUNTER
Spoke with Lisa/[facility].  Requesting orders for Miralax to be faxed to [phone].  Please advise.

## 2017-12-06 NOTE — TELEPHONE ENCOUNTER
----- Message from Vilma Villaseñor sent at 12/6/2017 10:34 AM CST -----  Contact: Lisa (nurse at [facility])   Lisa called and stated she needed to speak to the nurse. She stated that she is calling regarding the pt's medication order. She can be reached at [phone] fax [phone].    Thanks,  Tf

## 2017-12-07 RX ORDER — POLYETHYLENE GLYCOL 3350 17 G/17G
17 POWDER, FOR SOLUTION ORAL DAILY
Qty: 510 G | Refills: 0 | Status: SHIPPED | OUTPATIENT
Start: 2017-12-07 | End: 2017-12-07 | Stop reason: SDUPTHER

## 2017-12-07 RX ORDER — POLYETHYLENE GLYCOL 3350 17 G/17G
17 POWDER, FOR SOLUTION ORAL DAILY
Qty: 510 G | Refills: 11 | Status: SHIPPED | OUTPATIENT
Start: 2017-12-07 | End: 2018-01-06

## 2017-12-07 NOTE — TELEPHONE ENCOUNTER
Spoke with Lori/[facility].  Requesting Miralax orders to be faxed to [phone]. Provider notified.

## 2017-12-09 NOTE — PROGRESS NOTES
Clinic Consult:  [facility] Consultation Note    Reason for Consult:  The primary encounter diagnosis was Diarrhea, unspecified type. A diagnosis of Constipation, unspecified constipation type was also pertinent to this visit.    PCP: Nathalia Dunlap   [address] [place] [address]    HPI:  This is a 89 y.o. female here for evaluation of diarrhea. She was referred to me by Dr. Dunlap. She is accompanied by someone with her assisted living residence.   She presents to clinic with complaints of diarrhea. Diarrhea is intermittent but is very frequent when she is having it. She reports several watery stools per day and also has incontinence issues. She was previously on Metformin but was discontinued secondary to GI complaints. There was initial improvement but symptoms returned a couple of weeks later. Her last bowel movement was 5 days ago. She takes dulcolax stool softener. No abdominal pain, hematochezia, melena, nausea, vomiting, or weight loss.     Review of Systems   Constitutional: Negative for fever, malaise/fatigue and weight loss.   HENT: Negative for sore throat.    Respiratory: Negative for cough and wheezing.    Cardiovascular: Negative for chest pain and palpitations.   Gastrointestinal: Positive for constipation and diarrhea. Negative for abdominal pain, blood in stool, heartburn, melena, nausea and vomiting.   Genitourinary: Negative for dysuria and frequency.   Musculoskeletal: Negative for back pain, joint pain, myalgias and neck pain.   Skin: Negative for itching and rash.   Neurological: Negative for dizziness, speech change, seizures, loss of consciousness and headaches.   Psychiatric/Behavioral: Negative for depression and substance abuse. The patient is not nervous/anxious.        Medical History:  has a past medical history of Aortic insufficiency; CKD (chronic kidney disease) stage 3, GFR 30-59 ml/min; Colon polyps; Diabetes mellitus type 2, uncontrolled; Gait  instability; Hyperlipidemia; Hypertension; Hypothyroidism; Lower extremity edema; Macular degeneration; Osteoarthritis of multiple joints; Osteoporosis, senile; Pituitary adenoma; Tobacco use disorder; and Urinary incontinence.    Surgical History:  has a past surgical history that includes Cataract extraction w/ intraocular lens  implant, bilateral; Pituitary adenectomy; Cholecystectomy; Appendectomy; TONSILLECTOMY, ADENOIDECTOMY; and Hysterectomy.    Family History: family history includes Alzheimer's disease in her father; Cancer in her sister; Heart disease in her maternal grandmother and mother..     Social History:  reports that she quit smoking about 5 weeks ago. Her smoking use included Cigarettes. She smoked 0.50 packs per day. She has never used smokeless tobacco. She reports that she does not drink alcohol or use drugs.    Allergies: Reviewed    Home Medications:   Current Outpatient Prescriptions on File Prior to Visit   Medication Sig Dispense Refill    blood sugar diagnostic, disc Strp 1 strip by Misc.(Non-Drug; Combo Route) route 2 (two) times daily. Please send Accu Chek Guide 100 strip 5    furosemide (LASIX) 20 MG tablet TAKE 1 TABLET(20 MG) BY MOUTH TWICE DAILY 180 tablet 1    glimepiride (AMARYL) 2 MG tablet Take 1 tablet (2 mg total) by mouth before breakfast. 30 tablet 6    LANCETS & BLOOD GLUCOSE STRIPS MISC by Misc.(Non-Drug; Combo Route) route 2 (two) times daily.      lancets (FREESTYLE LANCETS) 28 gauge Misc 1 Units by Misc.(Non-Drug; Combo Route) route as directed. Check blood sugar 3 to 4 times daily. 100 each 6    potassium chloride (MICRO-K) 10 MEQ CpSR Take 1 capsule (10 mEq total) by mouth once daily. 30 capsule 6    simvastatin (ZOCOR) 40 MG tablet Take 1 tablet (40 mg total) by mouth every evening. 30 tablet 6    buPROPion (WELLBUTRIN SR) 150 MG TBSR 12 hr tablet Take 1 tablet daily for 1 week, then increase to 1 tablet twice daily. 60 tablet 6    ergocalciferol  "(ERGOCALCIFEROL) 50,000 unit Cap Take 1 capsule (50,000 Units total) by mouth every 7 days. 4 capsule 6    fish oil-omega-3 fatty acids 300-1,000 mg capsule Take 2 g by mouth.      lisinopril 10 MG tablet Take 1 tablet (10 mg total) by mouth once daily. 30 tablet 6    meloxicam (MOBIC) 7.5 MG tablet Take 1 tablet (7.5 mg total) by mouth once daily. 30 tablet 6     No current facility-administered medications on file prior to visit.        Physical Exam:  Vital Signs:  BP (!) 136/56   Pulse 60   Ht 5' 4" (1.626 m)   Wt 65.5 kg (144 lb 6.4 oz)   BMI 24.79 kg/m²   Body mass index is 24.79 kg/m².  Physical Exam   Constitutional: She is oriented to person, place, and time and well-developed, well-nourished, and in no distress. No distress.   HENT:   Head: Normocephalic.   Eyes: Conjunctivae are normal. Pupils are equal, round, and reactive to light.   Cardiovascular: Normal rate, regular rhythm and normal heart sounds.    Pulmonary/Chest: Effort normal and breath sounds normal. No respiratory distress.   Abdominal: Soft. Bowel sounds are normal. She exhibits no distension. There is no tenderness.   Neurological: She is alert and oriented to person, place, and time. No cranial nerve deficit.   Skin: Skin is warm and dry. No rash noted.   Psychiatric: Mood and affect normal.       Labs: Pertinent labs reviewed.    Assessment:  1. Diarrhea, unspecified type    2. Constipation, unspecified constipation type           Recommendations:  Based on symptomology. I am suspicious for underlying constipation with overflow diarrhea. Will get abdominal xray to assess bowel gas pattern. Recommendations to follow imaging. Will notify patient of the results but also will need to notify daughter as well.   -     X-Ray Abdomen Flat And Erect; Future; Expected date: 12/04/2017    Follow up to be determined after imaging.    Thank you so much for allowing me to participate in the care of KEREN Pool  "

## 2017-12-14 ENCOUNTER — TELEPHONE (OUTPATIENT)
Dept: GASTROENTEROLOGY | Facility: CLINIC | Age: 82
End: 2017-12-14

## 2017-12-14 NOTE — TELEPHONE ENCOUNTER
Spoke with patient's daughter, Cynthia.  Requesting an appointment to discuss condition and treatment.  Appointment scheduled.  Cynthia voiced understanding.

## 2017-12-14 NOTE — TELEPHONE ENCOUNTER
----- Message from Lidia Cleveland sent at 12/13/2017 12:13 PM CST -----  Contact: bryannareshma villa/pt daughter   Call caller regarding questions that she has and pt condition.    [phone]

## 2017-12-19 ENCOUNTER — OFFICE VISIT (OUTPATIENT)
Dept: GASTROENTEROLOGY | Facility: CLINIC | Age: 82
End: 2017-12-19
Payer: MEDICARE

## 2017-12-19 ENCOUNTER — HOSPITAL ENCOUNTER (OUTPATIENT)
Dept: RADIOLOGY | Facility: HOSPITAL | Age: 82
Discharge: HOME | End: 2017-12-19
Attending: NURSE PRACTITIONER
Payer: MEDICARE

## 2017-12-19 VITALS
HEIGHT: 64 IN | HEART RATE: 68 BPM | SYSTOLIC BLOOD PRESSURE: 122 MMHG | WEIGHT: 138.88 LBS | DIASTOLIC BLOOD PRESSURE: 70 MMHG | BODY MASS INDEX: 23.71 KG/M2

## 2017-12-19 DIAGNOSIS — R15.9 INCONTINENCE OF FECES, UNSPECIFIED FECAL INCONTINENCE TYPE: Primary | ICD-10-CM

## 2017-12-19 DIAGNOSIS — R15.9 INCONTINENCE OF FECES, UNSPECIFIED FECAL INCONTINENCE TYPE: ICD-10-CM

## 2017-12-19 DIAGNOSIS — K59.00 CONSTIPATION, UNSPECIFIED CONSTIPATION TYPE: ICD-10-CM

## 2017-12-19 DIAGNOSIS — R53.83 FATIGUE, UNSPECIFIED TYPE: ICD-10-CM

## 2017-12-19 PROCEDURE — 74020 XR ABDOMEN FLAT AND ERECT: CPT | Mod: TC,PO

## 2017-12-19 PROCEDURE — 99999 PR PBB SHADOW E&M-EST. PATIENT-LVL III: CPT | Mod: PBBFAC,,, | Performed by: NURSE PRACTITIONER

## 2017-12-19 PROCEDURE — 99214 OFFICE O/P EST MOD 30 MIN: CPT | Mod: S$GLB,,, | Performed by: NURSE PRACTITIONER

## 2017-12-19 PROCEDURE — 74020 XR ABDOMEN FLAT AND ERECT: CPT | Mod: 26,,, | Performed by: RADIOLOGY

## 2017-12-19 NOTE — PATIENT INSTRUCTIONS
Start keeping a bowel log of each bowel movement she has. Should include notes with each one about if she made it to the toilet or if there was an accident. Also, include the characteristics of the stool (eg: watery, loose, formed, any blood, etc...) Please contact my office in a couple of weeks to discuss this.

## 2017-12-20 ENCOUNTER — TELEPHONE (OUTPATIENT)
Dept: FAMILY MEDICINE | Facility: CLINIC | Age: 82
End: 2017-12-20

## 2017-12-20 ENCOUNTER — TELEPHONE (OUTPATIENT)
Dept: GASTROENTEROLOGY | Facility: CLINIC | Age: 82
End: 2017-12-20

## 2017-12-20 NOTE — TELEPHONE ENCOUNTER
She already has been to gastroenterology where she was evaluated and treated.  She was seen yesterday.

## 2017-12-20 NOTE — TELEPHONE ENCOUNTER
----- Message from Radha Bobo sent at 12/20/2017  1:32 PM CST -----  Contact: Snehal, pt's daughter  She's calling to get a referral to a gastro doctor for pt, please advise [phone]

## 2017-12-20 NOTE — TELEPHONE ENCOUNTER
Patient's daughter Cynthia notified that her mothers labs came back ok.  Her blood sugar was elevated to 182. Should address this with Dr. Dunlap.  Thyroid lab normal.  No anemia.  Xray still showed constipation despite taking the Miralax.  Would like to increase her Miralax to twice a day and see how she does.  She still needs to keep a bowel log and we will discuss this in about 2 weeks via phone call.  Cynthia voiced understanding.  Cynthia is requesting an order for Miralax be sent to the nursing home. Will have the nursing fax over the request for prescription.

## 2018-01-03 RX ORDER — GLIMEPIRIDE 2 MG/1
2 TABLET ORAL
Qty: 30 TABLET | Refills: 3 | Status: SHIPPED | OUTPATIENT
Start: 2018-01-03 | End: 2018-11-20

## 2018-01-04 RX ORDER — ERGOCALCIFEROL 1.25 MG/1
50000 CAPSULE ORAL
Qty: 4 CAPSULE | Refills: 2 | OUTPATIENT
Start: 2018-01-04

## 2018-01-05 ENCOUNTER — TELEPHONE (OUTPATIENT)
Dept: FAMILY MEDICINE | Facility: CLINIC | Age: 83
End: 2018-01-05

## 2018-01-05 NOTE — TELEPHONE ENCOUNTER
----- Message from Lisa Russo sent at 1/5/2018  2:18 PM CST -----  Contact: [facility]  She is calling in regards to the pt's medication Glimepiride 2mg 1 tablet by mouth before breakfast,  Refill authorization requested    She can be reached at [phone].

## 2018-01-08 DIAGNOSIS — E55.9 VITAMIN D DEFICIENCY: Primary | ICD-10-CM

## 2018-01-08 DIAGNOSIS — R60.0 LEG EDEMA: ICD-10-CM

## 2018-01-08 RX ORDER — POTASSIUM CHLORIDE 750 MG/1
10 CAPSULE, EXTENDED RELEASE ORAL DAILY
Qty: 30 CAPSULE | Refills: 2 | Status: SHIPPED | OUTPATIENT
Start: 2018-01-08 | End: 2018-04-23 | Stop reason: SDUPTHER

## 2018-01-08 NOTE — TELEPHONE ENCOUNTER
----- Message from Ashley Figueroa sent at 1/8/2018  4:37 PM CST -----  Contact: pt   Pt called in regards to facility she resides at is out of vitamin D need advice on what to do [phone]

## 2018-01-09 ENCOUNTER — TELEPHONE (OUTPATIENT)
Dept: FAMILY MEDICINE | Facility: CLINIC | Age: 83
End: 2018-01-09

## 2018-01-09 NOTE — TELEPHONE ENCOUNTER
----- Message from Fadi Garcia sent at 1/9/2018 10:40 AM CST -----  Contact: Pt Daughter  Please call daughter at  [phone] regarding pt having general weakness on left side .

## 2018-01-11 ENCOUNTER — LAB VISIT (OUTPATIENT)
Dept: LAB | Facility: HOSPITAL | Age: 83
End: 2018-01-11
Attending: FAMILY MEDICINE
Payer: MEDICARE

## 2018-01-11 DIAGNOSIS — E55.9 VITAMIN D DEFICIENCY: ICD-10-CM

## 2018-01-11 LAB — 25(OH)D3+25(OH)D2 SERPL-MCNC: 39 NG/ML

## 2018-01-11 PROCEDURE — 36415 COLL VENOUS BLD VENIPUNCTURE: CPT | Mod: PO

## 2018-01-11 PROCEDURE — 82306 VITAMIN D 25 HYDROXY: CPT

## 2018-01-11 RX ORDER — BUPROPION HYDROCHLORIDE 150 MG/1
TABLET, EXTENDED RELEASE ORAL
Qty: 60 TABLET | Refills: 3 | Status: SHIPPED | OUTPATIENT
Start: 2018-01-11 | End: 2018-05-18 | Stop reason: SDUPTHER

## 2018-01-19 RX ORDER — LISINOPRIL 10 MG/1
10 TABLET ORAL DAILY
Qty: 30 TABLET | Refills: 3 | Status: SHIPPED | OUTPATIENT
Start: 2018-01-19 | End: 2018-05-14 | Stop reason: SDUPTHER

## 2018-01-22 NOTE — TELEPHONE ENCOUNTER
Spoke with dr Pablo will go to orthopedic surgeon on wed and then will follow up us  after the apt if anything is needed from us

## 2018-01-22 NOTE — TELEPHONE ENCOUNTER
----- Message from Radha Bobo sent at 1/22/2018  4:01 PM CST -----  Contact: [facility]  She's calling stating that the pt fell and broke her pelvis this past weekend, she stated that the pt needs orders for a hospital bed and other things, please advise [phone]

## 2018-01-25 ENCOUNTER — TELEPHONE (OUTPATIENT)
Dept: FAMILY MEDICINE | Facility: CLINIC | Age: 83
End: 2018-01-25

## 2018-01-25 NOTE — TELEPHONE ENCOUNTER
----- Message from Joanie Gore sent at 1/25/2018  2:27 PM CST -----  Contact: Lori [facility]  .Calling concerning a refill of Vitamin D. Please call Lori @ [phone]. Thanks, sunshine

## 2018-02-08 ENCOUNTER — TELEPHONE (OUTPATIENT)
Dept: FAMILY MEDICINE | Facility: CLINIC | Age: 83
End: 2018-02-08

## 2018-02-08 NOTE — TELEPHONE ENCOUNTER
[facility] calling to inform you that pt is going to [facility]  States they will discharge patient  Also states patient was admitted to [facility] yesterday but they will call her so they will not admit pt

## 2018-03-15 ENCOUNTER — TELEPHONE (OUTPATIENT)
Dept: FAMILY MEDICINE | Facility: CLINIC | Age: 83
End: 2018-03-15

## 2018-03-15 DIAGNOSIS — E11.59 HYPERTENSION ASSOCIATED WITH DIABETES: Primary | ICD-10-CM

## 2018-03-15 DIAGNOSIS — I15.2 HYPERTENSION ASSOCIATED WITH DIABETES: Primary | ICD-10-CM

## 2018-03-15 NOTE — TELEPHONE ENCOUNTER
Pt needs a order for ensure to send to [facility]   To be given once a day   dtr request you specify which ensure can be given because she is a diabetic  Phone number to [facility] [phone]   fax number [phone]  Spoke with [employer] at [facility] and they states that's not true and they will contact the patient dtr

## 2018-03-15 NOTE — TELEPHONE ENCOUNTER
----- Message from Coco Dempsey sent at 3/15/2018 10:06 AM CDT -----  Contact: suyapa pelayo-daughter  Requesting orders for patient to have ensure. Please call back at [phone].      Thanks,  Coco Dempsey

## 2018-03-15 NOTE — TELEPHONE ENCOUNTER
----- Message from Elizabeth Flores sent at 3/15/2018 12:15 PM CDT -----  Contact: Belinda/[facility]  Please call nurse @ [phone] regarding order for ensure daily, please fax order to [phone].

## 2018-03-15 NOTE — TELEPHONE ENCOUNTER
----- Message from Kiersten Mojica sent at 3/15/2018  3:53 PM CDT -----  Contact: Ankita/[facility]  States she's moving in on the week of 3/26 and she needs to email or scan the paper work to us, before she moves to them. States they need to get an order for a chest xray or if we have one on file (can't be more than 30 days old). States they can get mobile to do the xray, they just need an order. Please call Ankita at [phone] or [phone]. Thank you

## 2018-03-28 DIAGNOSIS — E78.5 DYSLIPIDEMIA: ICD-10-CM

## 2018-03-28 DIAGNOSIS — R60.0 LEG EDEMA: ICD-10-CM

## 2018-03-28 NOTE — TELEPHONE ENCOUNTER
----- Message from Ashley Figueroa sent at 3/28/2018  9:56 AM CDT -----  Contact: Ankita-[facility]   Ankita called to check the status of paperwork that was sent over to be completed and faxed back in regards to pt moving in the facility...[phone]

## 2018-04-16 ENCOUNTER — PES CALL (OUTPATIENT)
Dept: ADMINISTRATIVE | Facility: CLINIC | Age: 83
End: 2018-04-16

## 2018-04-17 RX ORDER — SIMVASTATIN 40 MG/1
40 TABLET, FILM COATED ORAL NIGHTLY
Qty: 30 TABLET | Refills: 0 | Status: SHIPPED | OUTPATIENT
Start: 2018-04-17 | End: 2018-05-21 | Stop reason: SDUPTHER

## 2018-04-17 NOTE — TELEPHONE ENCOUNTER
----- Message from Tiesha Webster sent at 4/17/2018 11:08 AM CDT -----  Contact: pt daughter  Call pt at [phone] regarding refill on cholesterol medication      [facility] - [place] - [address]  [address]  [place] [address]  Phone: [phone] Fax: [phone]    [facility] - [place]  [address]  [address] [place] [address]  Phone: [phone] Fax: [phone]    [facility] [address]  [place] - [address] [place] [address]  Phone: [phone] Fax: [phone]    [facility] [address]  [place]  [address] [facility]  [address]  [facility] [place] [address]  Phone: [phone] Fax: [phone]    [facility] - [place] - [address]  [address]  [address]  [facility] [address]  Phone: [phone] Fax: [phone]

## 2018-04-23 RX ORDER — POTASSIUM CHLORIDE 750 MG/1
10 CAPSULE, EXTENDED RELEASE ORAL DAILY
Qty: 30 CAPSULE | Refills: 5 | Status: SHIPPED | OUTPATIENT
Start: 2018-04-23 | End: 2018-11-14 | Stop reason: SDUPTHER

## 2018-04-23 NOTE — TELEPHONE ENCOUNTER
----- Message from Olga Ford sent at 4/23/2018 12:21 PM CDT -----  [facility]  Lori requesting a Rx refill for Potassium.     Pt use..    [facility] - [place] [address]  [address]  [address]  [place] [address]  Phone: [phone] Fax: [phone]    Please adv/call [phone].[email or web address]

## 2018-04-24 ENCOUNTER — TELEPHONE (OUTPATIENT)
Dept: FAMILY MEDICINE | Facility: CLINIC | Age: 83
End: 2018-04-24

## 2018-04-24 NOTE — TELEPHONE ENCOUNTER
----- Message from Hermilo Jamison sent at 4/24/2018 10:28 AM CDT -----  Contact: Chantal ( [facility]   Chantal ( [facility] ) is requesting a call refill potassium chloride 10 MEQ.          Please call Chantal ( [facility] [phone] ( office ) fax [phone] (

## 2018-04-26 ENCOUNTER — TELEPHONE (OUTPATIENT)
Dept: FAMILY MEDICINE | Facility: CLINIC | Age: 83
End: 2018-04-26

## 2018-04-26 NOTE — TELEPHONE ENCOUNTER
----- Message from Tasia Coleman sent at 4/26/2018 10:05 AM CDT -----  Contact: Ankita Fowler/ [facility] [phone]  fax [phone]  States that she needs to speak to nurse regarding pt pre admit paperwork. States that pt is moving in on 05/04/18. States that she needs the pre admit orders faxed to [phone]. Please call back at [phone]//thank you acc

## 2018-04-27 ENCOUNTER — TELEPHONE (OUTPATIENT)
Dept: FAMILY MEDICINE | Facility: CLINIC | Age: 83
End: 2018-04-27

## 2018-04-27 NOTE — TELEPHONE ENCOUNTER
----- Message from Kiersten Mojica sent at 4/27/2018  9:56 AM CDT -----  Contact: [facility]/Ankita  States she needs to speak to Lora regarding her Assisted Living paperwork. Please call Ankita at [phone]. Thank you

## 2018-04-27 NOTE — TELEPHONE ENCOUNTER
----- Message from Michael Robb sent at 4/27/2018  9:43 AM CDT -----  Contact: daughtersanto  Needs callback rg forms that were faxed over, will elaborate..[phone].

## 2018-04-30 ENCOUNTER — LAB VISIT (OUTPATIENT)
Dept: LAB | Facility: HOSPITAL | Age: 83
End: 2018-04-30
Attending: FAMILY MEDICINE
Payer: MEDICARE

## 2018-04-30 ENCOUNTER — OFFICE VISIT (OUTPATIENT)
Dept: FAMILY MEDICINE | Facility: CLINIC | Age: 83
End: 2018-04-30
Payer: MEDICARE

## 2018-04-30 VITALS
DIASTOLIC BLOOD PRESSURE: 68 MMHG | HEIGHT: 64 IN | SYSTOLIC BLOOD PRESSURE: 122 MMHG | RESPIRATION RATE: 18 BRPM | BODY MASS INDEX: 22.47 KG/M2 | WEIGHT: 131.63 LBS | HEART RATE: 65 BPM | TEMPERATURE: 98 F

## 2018-04-30 DIAGNOSIS — M80.00XD AGE-RELATED OSTEOPOROSIS WITH CURRENT PATHOLOGICAL FRACTURE WITH ROUTINE HEALING: ICD-10-CM

## 2018-04-30 DIAGNOSIS — I15.2 HYPERTENSION ASSOCIATED WITH DIABETES: ICD-10-CM

## 2018-04-30 DIAGNOSIS — E11.59 HYPERTENSION ASSOCIATED WITH DIABETES: ICD-10-CM

## 2018-04-30 DIAGNOSIS — E11.22 CONTROLLED TYPE 2 DIABETES MELLITUS WITH STAGE 3 CHRONIC KIDNEY DISEASE, WITHOUT LONG-TERM CURRENT USE OF INSULIN: ICD-10-CM

## 2018-04-30 DIAGNOSIS — N18.30 CKD (CHRONIC KIDNEY DISEASE) STAGE 3, GFR 30-59 ML/MIN: ICD-10-CM

## 2018-04-30 DIAGNOSIS — H35.3290 EXUDATIVE AGE-RELATED MACULAR DEGENERATION, UNSPECIFIED LATERALITY, UNSPECIFIED STAGE: ICD-10-CM

## 2018-04-30 DIAGNOSIS — N18.30 CONTROLLED TYPE 2 DIABETES MELLITUS WITH STAGE 3 CHRONIC KIDNEY DISEASE, WITHOUT LONG-TERM CURRENT USE OF INSULIN: ICD-10-CM

## 2018-04-30 DIAGNOSIS — E78.5 HYPERLIPIDEMIA ASSOCIATED WITH TYPE 2 DIABETES MELLITUS: ICD-10-CM

## 2018-04-30 DIAGNOSIS — S32.019A CLOSED FRACTURE OF FIRST LUMBAR VERTEBRA, UNSPECIFIED FRACTURE MORPHOLOGY, INITIAL ENCOUNTER: ICD-10-CM

## 2018-04-30 DIAGNOSIS — E11.69 HYPERLIPIDEMIA ASSOCIATED WITH TYPE 2 DIABETES MELLITUS: ICD-10-CM

## 2018-04-30 DIAGNOSIS — Z00.00 PREVENTATIVE HEALTH CARE: Primary | ICD-10-CM

## 2018-04-30 DIAGNOSIS — S32.9XXA CLOSED NONDISPLACED FRACTURE OF PELVIS, UNSPECIFIED PART OF PELVIS, INITIAL ENCOUNTER: ICD-10-CM

## 2018-04-30 LAB
ALBUMIN SERPL BCP-MCNC: 3.6 G/DL
ALP SERPL-CCNC: 70 U/L
ALT SERPL W/O P-5'-P-CCNC: 7 U/L
ANION GAP SERPL CALC-SCNC: 10 MMOL/L
AST SERPL-CCNC: 14 U/L
BASOPHILS # BLD AUTO: 0.06 K/UL
BASOPHILS NFR BLD: 0.9 %
BILIRUB SERPL-MCNC: 0.3 MG/DL
BUN SERPL-MCNC: 19 MG/DL
CALCIUM SERPL-MCNC: 9.7 MG/DL
CHLORIDE SERPL-SCNC: 105 MMOL/L
CHOLEST SERPL-MCNC: 156 MG/DL
CHOLEST/HDLC SERPL: 3.5 {RATIO}
CO2 SERPL-SCNC: 23 MMOL/L
CREAT SERPL-MCNC: 1 MG/DL
DIFFERENTIAL METHOD: ABNORMAL
EOSINOPHIL # BLD AUTO: 0.4 K/UL
EOSINOPHIL NFR BLD: 5.7 %
ERYTHROCYTE [DISTWIDTH] IN BLOOD BY AUTOMATED COUNT: 13.2 %
EST. GFR  (AFRICAN AMERICAN): 57.7 ML/MIN/1.73 M^2
EST. GFR  (NON AFRICAN AMERICAN): 50.1 ML/MIN/1.73 M^2
ESTIMATED AVG GLUCOSE: 105 MG/DL
GLUCOSE SERPL-MCNC: 75 MG/DL
HBA1C MFR BLD HPLC: 5.3 %
HCT VFR BLD AUTO: 39.4 %
HDLC SERPL-MCNC: 45 MG/DL
HDLC SERPL: 28.8 %
HGB BLD-MCNC: 12.9 G/DL
IMM GRANULOCYTES # BLD AUTO: 0.03 K/UL
IMM GRANULOCYTES NFR BLD AUTO: 0.5 %
LDLC SERPL CALC-MCNC: 87.4 MG/DL
LYMPHOCYTES # BLD AUTO: 1.6 K/UL
LYMPHOCYTES NFR BLD: 24.6 %
MCH RBC QN AUTO: 31.6 PG
MCHC RBC AUTO-ENTMCNC: 32.7 G/DL
MCV RBC AUTO: 97 FL
MONOCYTES # BLD AUTO: 0.6 K/UL
MONOCYTES NFR BLD: 9.1 %
NEUTROPHILS # BLD AUTO: 3.8 K/UL
NEUTROPHILS NFR BLD: 59.2 %
NONHDLC SERPL-MCNC: 111 MG/DL
NRBC BLD-RTO: 0 /100 WBC
PLATELET # BLD AUTO: 311 K/UL
PMV BLD AUTO: 12.1 FL
POTASSIUM SERPL-SCNC: 4.4 MMOL/L
PROT SERPL-MCNC: 6.9 G/DL
RBC # BLD AUTO: 4.08 M/UL
SODIUM SERPL-SCNC: 138 MMOL/L
TRIGL SERPL-MCNC: 118 MG/DL
TSH SERPL DL<=0.005 MIU/L-ACNC: 1.55 UIU/ML
WBC # BLD AUTO: 6.37 K/UL

## 2018-04-30 PROCEDURE — 84443 ASSAY THYROID STIM HORMONE: CPT

## 2018-04-30 PROCEDURE — 99499 UNLISTED E&M SERVICE: CPT | Mod: S$GLB,,, | Performed by: FAMILY MEDICINE

## 2018-04-30 PROCEDURE — 85025 COMPLETE CBC W/AUTO DIFF WBC: CPT

## 2018-04-30 PROCEDURE — 99999 PR PBB SHADOW E&M-EST. PATIENT-LVL III: CPT | Mod: PBBFAC,,, | Performed by: FAMILY MEDICINE

## 2018-04-30 PROCEDURE — 80053 COMPREHEN METABOLIC PANEL: CPT

## 2018-04-30 PROCEDURE — 36415 COLL VENOUS BLD VENIPUNCTURE: CPT | Mod: PO

## 2018-04-30 PROCEDURE — 99397 PER PM REEVAL EST PAT 65+ YR: CPT | Mod: S$GLB,,, | Performed by: FAMILY MEDICINE

## 2018-04-30 PROCEDURE — 80061 LIPID PANEL: CPT

## 2018-04-30 PROCEDURE — 83036 HEMOGLOBIN GLYCOSYLATED A1C: CPT

## 2018-04-30 RX ORDER — LUBIPROSTONE 8 UG/1
8 CAPSULE ORAL
COMMUNITY
End: 2018-11-08

## 2018-04-30 NOTE — PROGRESS NOTES
CHIEF COMPLAINT: This is an 89-year-old female here for preventive health exam.    SUBJECTIVE: Patient is also here to fill out forms for movement from independent living situation to assisted living.  Patient has had multiple falls in the past and sustained pelvic fracture and compression fracture of L1 in the last few months.  She is currently in a brace for compression fracture and is undergoing physical therapy.  Patient has osteoporosis but is on no current treatment.  She has less frequent episodes of diarrhea with incontinence.  She is followed by gastroenterologist at [facility].  She takes Amitiza 8 mg once daily.  Patient is a type II diabetic currently takes glimepiride 2 mg with breakfast.  Her blood sugars have been .  She denies polyuria, polydipsia or polyphagia.  Last A1c was 6.6% 6 months ago.  Patient has lost 15 pounds in the last year.  She takes lisinopril 10 mg daily for hypertension and simvastatin 40 mg daily for hyperlipidemia.  She takes Lasix 20 mg twice daily for hypertension and lower extremity edema.  She takes Wellbutrin  mg twice daily for smoking cessation.  She has effectively stop smoking since November 2017.  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  She has mild bilateral hearing loss.    Eye exam January 2018. Mammogram 2008. Colonoscopy May 2011. Bone DEXA scan July 2013 (patient refuses). Pneumovax May 2011. Flu vaccine October 2017. Prevnar April 2015.     ROS:  GENERAL: Patient denies fever, chills, night sweats. Patient denies weight gain. Patient denies anorexia, fatigue, weakness or swollen glands.  SKIN: Patient denies rash or hair loss.  HEENT: Patient denies sore throat, ear pain, hearing loss, nasal congestion, or runny nose. Patient denies visual disturbance, eye irritation or discharge.  LUNGS: Patient denies cough, wheeze or hemoptysis.  CARDIOVASCULAR: Patient denies chest pain, shortness of breath, palpitations, syncope or  lower extremity edema.  GI: Patient denies abdominal pain, nausea, vomiting, blood in stool or melena.  Positive for constipation and diarrhea.  GENITOURINARY: Patient denies pelvic pain, vaginal discharge, itch or odor. Patient denies irregular vaginal bleeding. Patient denies dysuria, frequency, hematuria, nocturia, urgency or incontinence.  BREASTS: Patient denies breast pain, mass or nipple discharge.  MUSCULOSKELETAL: Patient denies joint pain, swelling, redness or warmth.  NEUROLOGIC: Patient denies headache, vertigo, paresthesias, weakness in limb, dysarthria, dysphagia or abnormality of gait.  PSYCHIATRIC: Patient denies anxiety, depression, or memory loss.     OBJECTIVE:   GENERAL: Well-developed well-nourished elderly white female alert and oriented x3, in no acute distress. Memory, judgment and cognition without deficit.  Weight loss of 15 pounds in the last year.  Patient is seated in wheelchair with chest/back brace on.  Accompanied by daughter.  SKIN: Clear without rash. Normal color and tone.  HEENT: Eyes: Clear conjunctivae. No scleral icterus.  No scleral icterus. Ears: Clear canals. Clear TMs. Nose: Without congestion. Pharynx: Without injection or exudates.  NECK: Supple, normal range of motion. No masses, lymphadenopathy or enlarged thyroid. No JVD. Carotids 2+ and equal. No bruits.  LUNGS: Clear to auscultation. Normal respiratory effort.  CARDIOVASCULAR: Regular rhythm, normal S1, S2 without murmur, gallop or rub.  BACK: No CVA or spinal tenderness.  ABDOMEN: Normal appearance. Active bowel sounds. Soft, nontender without mass or organomegaly. No rebound or guarding.  EXTREMITIES: Without cyanosis, clubbing or edema. Distal pulses 2+ and equal. Normal range of motion in all extremities. No joint effusion, erythema or warmth.  NEUROLOGIC: Cranial nerves II through XII without deficit. Motor strength equal bilaterally. Sensation normal to touch. Deep tendon reflexes 2+ and equal. Gait unsteady  without walker. No tremor. Negative cerebellar signs.   FOOT EVALUATION: 10 gram monofilament exam with protective sensation intact bilaterally. Nails appropriately trimmed. No ulcers. Distal pulses palpable.    PELVIC: Deferred.     ASSESSMENT:  1. Preventative health care    2. CKD (chronic kidney disease) stage 3, GFR 30-59 ml/min    3. Controlled type 2 diabetes mellitus with stage 3 chronic kidney disease, without long-term current use of insulin    4. Hyperlipidemia associated with type 2 diabetes mellitus    5. Hypertension associated with diabetes    6. Exudative age-related macular degeneration, unspecified laterality, unspecified stage    7. Age-related osteoporosis with current pathological fracture with routine healing    8. Closed fracture of lumbar vertebra, unspecified fracture morphology, unspecified lumbar vertebral level, initial encounter      PLAN:   1.  Exercise regularly.  2.  Age-appropriate counseling.  3.  Fasting lab.  4.  Bone DEXA scan.  6.  Refill medications as needed.  7.  Medical profile reports filled out for [facility].

## 2018-05-03 RX ORDER — FUROSEMIDE 20 MG/1
TABLET ORAL
Qty: 180 TABLET | Refills: 3 | Status: SHIPPED | OUTPATIENT
Start: 2018-05-03 | End: 2018-10-01 | Stop reason: SDUPTHER

## 2018-05-04 RX ORDER — GLIMEPIRIDE 2 MG/1
2 TABLET ORAL
Qty: 30 TABLET | Refills: 3 | OUTPATIENT
Start: 2018-05-04 | End: 2018-11-30

## 2018-05-04 NOTE — TELEPHONE ENCOUNTER
I sent a message to her daughter about her lab results and suggested that we discontinue the glimepiride.  No one has reviewed her results obviously.    Dr. Dunlap

## 2018-05-04 NOTE — TELEPHONE ENCOUNTER
----- Message from Kiersten Mojica sent at 5/4/2018 12:01 PM CDT -----  Contact: [facility]/Lori  States they need a refill on her glimepiride. States the pharmacy sent a request and she will b out in a few day. States she would like to verify her laxis order, the pharmacy has something different than what they have. Pt uses     [facility] [place] [address]  [address]  [address]  [place] [address]  Phone: [phone] Fax: [phone]    Please call Lori at [phone]. Thank you

## 2018-05-14 RX ORDER — LISINOPRIL 10 MG/1
10 TABLET ORAL DAILY
Qty: 90 TABLET | Refills: 3 | Status: SHIPPED | OUTPATIENT
Start: 2018-05-14 | End: 2019-04-29 | Stop reason: ALTCHOICE

## 2018-05-16 ENCOUNTER — APPOINTMENT (OUTPATIENT)
Dept: RADIOLOGY | Facility: CLINIC | Age: 83
End: 2018-05-16
Attending: FAMILY MEDICINE
Payer: MEDICARE

## 2018-05-16 DIAGNOSIS — S32.019A CLOSED FRACTURE OF FIRST LUMBAR VERTEBRA, UNSPECIFIED FRACTURE MORPHOLOGY, INITIAL ENCOUNTER: ICD-10-CM

## 2018-05-16 PROCEDURE — 77080 DXA BONE DENSITY AXIAL: CPT | Mod: 26,,, | Performed by: RADIOLOGY

## 2018-05-16 PROCEDURE — 77080 DXA BONE DENSITY AXIAL: CPT | Mod: TC,PO

## 2018-05-18 ENCOUNTER — PES CALL (OUTPATIENT)
Dept: ADMINISTRATIVE | Facility: CLINIC | Age: 83
End: 2018-05-18

## 2018-05-18 RX ORDER — BUPROPION HYDROCHLORIDE 150 MG/1
TABLET, EXTENDED RELEASE ORAL
Qty: 180 TABLET | Refills: 3 | Status: SHIPPED | OUTPATIENT
Start: 2018-05-18 | End: 2019-10-11

## 2018-05-21 DIAGNOSIS — E78.5 DYSLIPIDEMIA: ICD-10-CM

## 2018-05-21 RX ORDER — SIMVASTATIN 40 MG/1
40 TABLET, FILM COATED ORAL NIGHTLY
Qty: 30 TABLET | Refills: 11 | Status: SHIPPED | OUTPATIENT
Start: 2018-05-21 | End: 2019-10-11 | Stop reason: SDUPTHER

## 2018-09-14 ENCOUNTER — PES CALL (OUTPATIENT)
Dept: ADMINISTRATIVE | Facility: CLINIC | Age: 83
End: 2018-09-14

## 2018-10-01 ENCOUNTER — OFFICE VISIT (OUTPATIENT)
Dept: FAMILY MEDICINE | Facility: CLINIC | Age: 83
End: 2018-10-01
Payer: MEDICARE

## 2018-10-01 ENCOUNTER — LAB VISIT (OUTPATIENT)
Dept: LAB | Facility: HOSPITAL | Age: 83
End: 2018-10-01
Attending: REGISTERED NURSE
Payer: MEDICARE

## 2018-10-01 VITALS
WEIGHT: 142.19 LBS | TEMPERATURE: 99 F | SYSTOLIC BLOOD PRESSURE: 140 MMHG | HEART RATE: 76 BPM | HEIGHT: 64 IN | DIASTOLIC BLOOD PRESSURE: 80 MMHG | BODY MASS INDEX: 24.28 KG/M2

## 2018-10-01 DIAGNOSIS — Z09 HOSPITAL DISCHARGE FOLLOW-UP: ICD-10-CM

## 2018-10-01 DIAGNOSIS — N39.0 ACUTE UTI (URINARY TRACT INFECTION): ICD-10-CM

## 2018-10-01 DIAGNOSIS — N17.9 ACUTE KIDNEY INJURY: ICD-10-CM

## 2018-10-01 DIAGNOSIS — R19.7 DIARRHEA, UNSPECIFIED TYPE: ICD-10-CM

## 2018-10-01 DIAGNOSIS — R60.9 EDEMA, UNSPECIFIED TYPE: ICD-10-CM

## 2018-10-01 DIAGNOSIS — L03.012 PARONYCHIA OF FINGER, LEFT: ICD-10-CM

## 2018-10-01 DIAGNOSIS — I95.9 HYPOTENSION, UNSPECIFIED HYPOTENSION TYPE: ICD-10-CM

## 2018-10-01 DIAGNOSIS — Z09 HOSPITAL DISCHARGE FOLLOW-UP: Primary | ICD-10-CM

## 2018-10-01 DIAGNOSIS — I15.2 HYPERTENSION ASSOCIATED WITH DIABETES: Chronic | ICD-10-CM

## 2018-10-01 DIAGNOSIS — E11.59 HYPERTENSION ASSOCIATED WITH DIABETES: Chronic | ICD-10-CM

## 2018-10-01 LAB
ALBUMIN SERPL BCP-MCNC: 3.6 G/DL
ALP SERPL-CCNC: 67 U/L
ALT SERPL W/O P-5'-P-CCNC: 18 U/L
ANION GAP SERPL CALC-SCNC: 9 MMOL/L
AST SERPL-CCNC: 21 U/L
BASOPHILS # BLD AUTO: 0.08 K/UL
BASOPHILS NFR BLD: 0.9 %
BILIRUB SERPL-MCNC: 0.3 MG/DL
BUN SERPL-MCNC: 16 MG/DL
CALCIUM SERPL-MCNC: 9.7 MG/DL
CHLORIDE SERPL-SCNC: 107 MMOL/L
CO2 SERPL-SCNC: 22 MMOL/L
CREAT SERPL-MCNC: 0.9 MG/DL
DIFFERENTIAL METHOD: ABNORMAL
EOSINOPHIL # BLD AUTO: 0.5 K/UL
EOSINOPHIL NFR BLD: 6 %
ERYTHROCYTE [DISTWIDTH] IN BLOOD BY AUTOMATED COUNT: 13.1 %
EST. GFR  (AFRICAN AMERICAN): >60 ML/MIN/1.73 M^2
EST. GFR  (NON AFRICAN AMERICAN): 56.9 ML/MIN/1.73 M^2
GLUCOSE SERPL-MCNC: 130 MG/DL
HCT VFR BLD AUTO: 36.3 %
HGB BLD-MCNC: 12.2 G/DL
IMM GRANULOCYTES # BLD AUTO: 0.04 K/UL
IMM GRANULOCYTES NFR BLD AUTO: 0.5 %
LYMPHOCYTES # BLD AUTO: 1 K/UL
LYMPHOCYTES NFR BLD: 12 %
MCH RBC QN AUTO: 32.5 PG
MCHC RBC AUTO-ENTMCNC: 33.6 G/DL
MCV RBC AUTO: 97 FL
MONOCYTES # BLD AUTO: 0.8 K/UL
MONOCYTES NFR BLD: 9.4 %
NEUTROPHILS # BLD AUTO: 6.1 K/UL
NEUTROPHILS NFR BLD: 71.2 %
NRBC BLD-RTO: 0 /100 WBC
PLATELET # BLD AUTO: 271 K/UL
PMV BLD AUTO: 12.5 FL
POTASSIUM SERPL-SCNC: 4.7 MMOL/L
PROT SERPL-MCNC: 6.8 G/DL
RBC # BLD AUTO: 3.75 M/UL
SODIUM SERPL-SCNC: 138 MMOL/L
WBC # BLD AUTO: 8.51 K/UL

## 2018-10-01 PROCEDURE — 85025 COMPLETE CBC W/AUTO DIFF WBC: CPT

## 2018-10-01 PROCEDURE — 80053 COMPREHEN METABOLIC PANEL: CPT

## 2018-10-01 PROCEDURE — 99214 OFFICE O/P EST MOD 30 MIN: CPT | Mod: S$PBB,,, | Performed by: REGISTERED NURSE

## 2018-10-01 PROCEDURE — 99214 OFFICE O/P EST MOD 30 MIN: CPT | Mod: PBBFAC,PO | Performed by: REGISTERED NURSE

## 2018-10-01 PROCEDURE — 99999 PR PBB SHADOW E&M-EST. PATIENT-LVL IV: CPT | Mod: PBBFAC,,, | Performed by: REGISTERED NURSE

## 2018-10-01 PROCEDURE — 36415 COLL VENOUS BLD VENIPUNCTURE: CPT | Mod: PO

## 2018-10-01 PROCEDURE — 1101F PT FALLS ASSESS-DOCD LE1/YR: CPT | Mod: CPTII,,, | Performed by: REGISTERED NURSE

## 2018-10-01 PROCEDURE — 99499 UNLISTED E&M SERVICE: CPT | Mod: S$GLB,,, | Performed by: REGISTERED NURSE

## 2018-10-01 RX ORDER — MULTIVIT WITH MINERALS/HERBS
1 TABLET ORAL DAILY
COMMUNITY
End: 2019-10-15 | Stop reason: SDUPTHER

## 2018-10-01 RX ORDER — FUROSEMIDE 20 MG/1
TABLET ORAL
Qty: 180 TABLET | Refills: 0 | Status: SHIPPED | OUTPATIENT
Start: 2018-10-01 | End: 2018-11-08

## 2018-10-01 RX ORDER — DOCUSATE SODIUM 100 MG/1
100 CAPSULE, LIQUID FILLED ORAL 2 TIMES DAILY
COMMUNITY

## 2018-10-01 RX ORDER — LOPERAMIDE HYDROCHLORIDE 2 MG/1
2 CAPSULE ORAL 4 TIMES DAILY PRN
COMMUNITY
End: 2021-06-18

## 2018-10-01 RX ORDER — NAPROXEN 250 MG/1
500 TABLET ORAL 2 TIMES DAILY WITH MEALS
COMMUNITY
End: 2019-04-15

## 2018-10-02 ENCOUNTER — TELEPHONE (OUTPATIENT)
Dept: FAMILY MEDICINE | Facility: CLINIC | Age: 83
End: 2018-10-02

## 2018-10-02 DIAGNOSIS — E11.59 HYPERTENSION ASSOCIATED WITH DIABETES: Primary | Chronic | ICD-10-CM

## 2018-10-02 DIAGNOSIS — I15.2 HYPERTENSION ASSOCIATED WITH DIABETES: Primary | Chronic | ICD-10-CM

## 2018-10-02 NOTE — TELEPHONE ENCOUNTER
----- Message from Kelsey Hills sent at 10/2/2018 10:46 AM CDT -----  Contact: pt daughter Cynthia  Calling in regards to need some orders to take blood pressure daily and calling for results and have questions about yesterday appointment and please advise [phone]

## 2018-10-02 NOTE — TELEPHONE ENCOUNTER
Pt's daughter states that pt seen Juan Lennon yesterday for a hospital follow up, she states that juan was under the impression that pt was off of her bp medication in the hospital but [facility] states that she was never off the medication when she was in the hospital and pt's daughter states that after a week of being out of the hospital her bp was high and pt's daughter wants to know if you want home health to do a bp check on her every so often or everyday because if so they would need an order from you stating that her blood pressure needs to be checked how ever many days you want them to check it.

## 2018-10-03 ENCOUNTER — TELEPHONE (OUTPATIENT)
Dept: FAMILY MEDICINE | Facility: CLINIC | Age: 83
End: 2018-10-03

## 2018-10-04 NOTE — TELEPHONE ENCOUNTER
----- Message from Ky Maradiaga sent at 10/4/2018 12:26 PM CDT -----  Contact: [facility]se [facility]   Nurse is calling regarding question on Vitamin 50,000 script. Nurse would like to know if this is a new script is an increase. Nurse needs clarification on Vitamin D. Nurse also would like to know if she can D/C Mobic. Nurse states that Pt has not taken in along time.  Demetrice [phone]

## 2018-10-08 NOTE — PROGRESS NOTES
Subjective:       Patient ID: Gwen Durbin is a 89 y.o. female.    Chief Complaint: Hospital Follow Up      [facility]  Admission Date: 9/21/2018.  Discharge Date: 9/25/2018.      HPI    Mrs. Durbin is here today with her daughter and caregiver//Brisa, for a hospital follow-up appointment.  Seen at [facility] ED from [facility] on 9/21/2018 for diarrhea and low blood pressure onset same day of ED visit.      Per hospital notes:  Diarrhea of presumed infectious origin  Ms. Durbin is a 89-year-old female with past medical history significant for type II DM, HTN, HLD, osteoporosis who presented from assisted living with acute onset of diarrhea. Patient has had over a year of problems with intermittent diarrhea and constipation with fecal incontinence. Patient is followed as an outpatient by gastroenterology. She was in her usual state of health until approximately 3 AM with date of presentation when she developed onset of generalized abdominal pain described as a crampy type pain. Symptoms were severe and associated with onset of profuse diarrhea. Patient did consider noted copious amounts of liquid stool. Described approximately 45 minutes to an hour duration of fairly continuous passage of loose stool. No described bright red blood or melena. EMS was called upon arrival patient's blood pressure is worse 60/43. She was started on IV fluids in route. Systolic blood pressures had improved to 100 range by the time she arrived to ED. Her abdominal pain had overall improved. Evaluation upfront in ED revealed evidence of acute kidney injury felt to be related to volume depletion. She also had evidence of UTI on urinalysis. Acute diarrheal episode was felt to be possible infectious gastroenteritis versus related to her underlying more chronic symptoms which seem to be related to irritable bowel syndrome. She was admitted and started on IV fluids. Patient's renal function responded well. Creatinine improved to her baseline range  of near 1 at the time of discharge. She did have mild leukocytosis which also resolved. She was afebrile throughout her hospital course. She tolerated her diet without any further episodes of diarrhea. Patient is recommended to follow-up with her gastroenterologist after discharge.    Hypotension  Patient presented from assisted living with low blood pressures due to volume depletion from GI losses. Her antihypertensives were held and she was given IV fluids. Blood pressures were responsive to IV fluid resuscitation. Blood pressures actually became mildly elevated consistent with baseline diagnosis of hypertension. She has been resumed on lisinopril prior to discharge.     Essential hypertension  Patient had initially presented with low blood pressures which responded to IV fluids. We will initiate her lisinopril was held. Once renal function improved to baseline ranges and blood pressures are actually mildly elevated her lisinopril was resumed. She tolerated resumption with stable renal function improved blood pressures. Lisinopril will be continued at discharge.    Type 2 diabetes mellitus without complication, without long-term current use of insulin (HCC)  Patient will be resumed on her glimepiride at discharge.    Acute kidney injury (HCC)  As noted patient presented with hypotension and acute kidney injury related to volume depletion from GI losses. She is typically on Lasix for lower extremity edema. Her diuretics were held and she was given gentle IV fluids. Patient's renal function improved to baseline ranges. She tolerated volume well. No evidence of volume overload at the time of discharge. Patient's diuretics will be adjusted from scheduled to as needed for lower extremity swelling to avoid recurrence of dehydration or volume depletion.     Acute cystitis without hematuria  Patient presented with GI complaints. She had a urinalysis performed in the ED. Urinalysis was abnormal with evidence of bacteria  though it was negative for WBC and leukocyte esterase. She was placed on IV Rocephin. Received 3 days of antibiotics which is likely a sufficient course. She has been afebrile without significant urinary complaints during her hospital course. No further need for antibiotics needed at discharge.       She reports feeling better but still having some generalized weakness.  Uses walker for ambulation.  Taking Lasix once daily.  Her main concern today is that of a finger infection with c/o pain around her nailbed to the LT long finger since around the day she was sent home from the hospital.  She has been keeping covered with bandaid and applying Neosporin to the area.  Not soaking.  Denies fever, chills, drainage or swelling.  Reports normal appetite.  No  problems although she wears a brief.        Review of Systems   Constitutional: Positive for fatigue. Negative for activity change, appetite change, chills, diaphoresis, fever and unexpected weight change.   HENT: Negative.    Eyes: Negative.    Respiratory: Negative.    Cardiovascular: Negative.    Gastrointestinal: Positive for diarrhea (resolved). Negative for abdominal distention, abdominal pain, anal bleeding, blood in stool, constipation, nausea, rectal pain and vomiting.   Endocrine: Negative for polydipsia, polyphagia and polyuria.   Genitourinary: Negative.    Musculoskeletal: Positive for gait problem (uses walker). Negative for joint swelling and neck pain.   Skin: Positive for color change (redness to finger/nailbed).   Neurological: Positive for weakness. Negative for tremors, syncope, light-headedness and headaches.   Hematological: Negative.    Psychiatric/Behavioral: Negative.        Review of patient's allergies indicates:  No Known Allergies    Patient Active Problem List   Diagnosis    Hypertension associated with diabetes    Exudative macular degeneration    Aortic valve insufficiency    Lower extremity edema    Gait instability     Age-related osteoporosis with current pathological fracture with routine healing    Tobacco use disorder    Controlled diabetes mellitus with kidney complication    Hearing impaired    Hyperlipidemia associated with type 2 diabetes mellitus    Vitamin D deficiency    CKD (chronic kidney disease) stage 3, GFR 30-59 ml/min    Osteoarthritis of multiple joints    Closed fracture of first lumbar vertebra    Closed nondisplaced fracture of pelvis       Current Outpatient Medications on File Prior to Visit   Medication Sig Dispense Refill    b complex vitamins tablet Take 1 tablet by mouth once daily.      buPROPion (WELLBUTRIN SR) 150 MG TBSR 12 hr tablet Take 1 tablet twice daily. 180 tablet 3    docusate sodium (COLACE) 100 MG capsule Take 100 mg by mouth 2 (two) times daily.      ergocalciferol (ERGOCALCIFEROL) 50,000 unit Cap Take 1 capsule (50,000 Units total) by mouth every 7 days. 4 capsule 6    fish oil-omega-3 fatty acids 300-1,000 mg capsule Take 2 g by mouth.      lisinopril 10 MG tablet Take 1 tablet (10 mg total) by mouth once daily. 90 tablet 3    loperamide (IMODIUM) 2 mg capsule Take 2 mg by mouth 4 (four) times daily as needed for Diarrhea.      lubiprostone (AMITIZA) 8 MCG Cap Take 8 mcg by mouth.      meloxicam (MOBIC) 7.5 MG tablet Take 1 tablet (7.5 mg total) by mouth once daily. 30 tablet 6    naproxen (NAPROSYN) 250 MG tablet Take 500 mg by mouth 2 (two) times daily with meals.      potassium chloride (MICRO-K) 10 MEQ CpSR Take 1 capsule (10 mEq total) by mouth once daily. 30 capsule 5    simvastatin (ZOCOR) 40 MG tablet Take 1 tablet (40 mg total) by mouth every evening. 30 tablet 11    vit C/vit E/lutein/min/omega-3 (OCUVITE ORAL) Take by mouth.      glimepiride (AMARYL) 2 MG tablet Take 1 tablet (2 mg total) by mouth before breakfast. 30 tablet 3         Past medical, surgical, family and social histories have been reviewed today.        Objective:     Vitals:    10/01/18  "1046 10/01/18 1127   BP: (!) 150/83 (!) 140/80   Pulse: 76    Temp: 98.7 °F (37.1 °C)    TempSrc: Oral    Weight: 64.5 kg (142 lb 3.2 oz)    Height: 5' 4" (1.626 m)    PainSc: 0-No pain          Physical Exam   Constitutional: She is oriented to person, place, and time. She appears well-developed and well-nourished. No distress.   HENT:   Head: Normocephalic and atraumatic.   Eyes: Conjunctivae and EOM are normal. Pupils are equal, round, and reactive to light. Right eye exhibits no discharge. Left eye exhibits no discharge. No scleral icterus.   Neck: Normal range of motion. Neck supple. No JVD present. No tracheal deviation present. No thyromegaly present.   Cardiovascular: Normal rate, regular rhythm, normal heart sounds and intact distal pulses. Exam reveals no gallop and no friction rub.   No murmur heard.  Pulmonary/Chest: Effort normal and breath sounds normal. No respiratory distress. She has no wheezes. She has no rales. She exhibits no tenderness.   Abdominal: Soft. Bowel sounds are normal. She exhibits no distension and no mass. There is no tenderness.   Musculoskeletal: Normal range of motion. She exhibits edema (1+ pitting BLE). She exhibits no deformity.   Lymphadenopathy:     She has no cervical adenopathy.   Neurological: She is alert and oriented to person, place, and time. No sensory deficit. She exhibits normal muscle tone. Coordination normal.   Skin: Skin is warm and dry. Capillary refill takes less than 2 seconds. She is not diaphoretic.   Slight redness to area around nailbed of the LT long finger, likely healing paronychia.  No swelling or drainage noted.   Psychiatric: She has a normal mood and affect. Her behavior is normal. Judgment and thought content normal.   Vitals reviewed.        Diagnosis       1. Hospital discharge follow-up    2. Hypotension, unspecified hypotension type    3. Acute kidney injury    4. Acute UTI (urinary tract infection)    5. Diarrhea, unspecified type    6. " Paronychia of finger, left    7. Edema, unspecified type    8. Hypertension associated with diabetes          Assessment/ Plan     Hospital discharge follow-up  · PT admitted to [facility] from 9/21/2018 through 9/25/2018, records reviewed in Epic.  -     CBC auto differential; Future; Expected date: 10/01/2018  -     Comprehensive metabolic panel; Future; Expected date: 10/01/2018  -     Urinalysis  -     Urine culture    Hypotension, unspecified hypotension type  · This problem has resolved, blood pressure likely low due to volume loss secondary to diarrhea.  · To recheck lab today.  · Orders:  -     CBC auto differential; Future; Expected date: 10/01/2018  -     Comprehensive metabolic panel; Future; Expected date: 10/01/2018  -     Urinalysis  -     Urine culture    Acute kidney injury  · To check lab today, PT with INDIGO related to volume depletion.  · Orders:  -     CBC auto differential; Future; Expected date: 10/01/2018  -     Comprehensive metabolic panel; Future; Expected date: 10/01/2018  -     Urinalysis  -     Urine culture    Acute UTI (urinary tract infection)  · PT has been treated for UTI, completed antibiotics, to recheck lab/urine.  · Orders:  -     CBC auto differential; Future; Expected date: 10/01/2018  -     Comprehensive metabolic panel; Future; Expected date: 10/01/2018  -     Urinalysis  -     Urine culture    Diarrhea, unspecified type  · Problem resolved, to check lab.  · Orders:  -     CBC auto differential; Future; Expected date: 10/01/2018  -     Comprehensive metabolic panel; Future; Expected date: 10/01/2018  -     Urinalysis  -     Urine culture    Paronychia of finger, left  · Problem resolving at this time with use of Neosporin and wound care.  · Advised on finger soaks with peroxide along with antibiotic ointment TID to QID.    Edema, unspecified type  · Problem not controlled, taking Lasix once daily although ordered BID.  · RX given for BID dosing.  · Low salt diet, elevate  legs.  · Orders:  -     furosemide (LASIX) 20 MG tablet; TAKE 1 TABLET(20 MG) BY MOUTH TWICE DAILY  Dispense: 180 tablet; Refill: 0    Hypertension associated with diabetes  · Blood pressure not controlled today, RX given for BID dosing as was ordered.  · To continue all other HTN medication as ordered.  · Orders:  -     furosemide (LASIX) 20 MG tablet; TAKE 1 TABLET(20 MG) BY MOUTH TWICE DAILY  Dispense: 180 tablet; Refill: 0        Lab and urine pending.  Home medications reviewed and discussed.  Follow-up in clinic as needed.        PAVITHRA Benavides  [facility]

## 2018-10-09 ENCOUNTER — TELEPHONE (OUTPATIENT)
Dept: FAMILY MEDICINE | Facility: CLINIC | Age: 83
End: 2018-10-09

## 2018-10-09 NOTE — TELEPHONE ENCOUNTER
----- Message from Joanie Gore sent at 10/9/2018  2:14 PM CDT -----  Contact: Anila with [facility]  Calling to request (per daughter) patient BP to be checked daily. Please call Anila @ [phone]. Thanks, sunshine

## 2018-10-10 ENCOUNTER — TELEPHONE (OUTPATIENT)
Dept: FAMILY MEDICINE | Facility: CLINIC | Age: 83
End: 2018-10-10

## 2018-10-10 NOTE — TELEPHONE ENCOUNTER
----- Message from George Dumont sent at 10/10/2018  2:54 PM CDT -----  Contact: Alexia- [facility] [phone]  She is calling to verify if the pt had her flu shot administered or not, please advise [phone] if no answer please leave a voicemail.

## 2018-10-19 ENCOUNTER — PES CALL (OUTPATIENT)
Dept: ADMINISTRATIVE | Facility: CLINIC | Age: 83
End: 2018-10-19

## 2018-11-02 ENCOUNTER — PES CALL (OUTPATIENT)
Dept: ADMINISTRATIVE | Facility: CLINIC | Age: 83
End: 2018-11-02

## 2018-11-05 ENCOUNTER — TELEPHONE (OUTPATIENT)
Dept: FAMILY MEDICINE | Facility: CLINIC | Age: 83
End: 2018-11-05

## 2018-11-05 NOTE — TELEPHONE ENCOUNTER
----- Message from Lidia Cleveland sent at 11/5/2018  8:07 AM CST -----  Contact: Cynthia/pt daughter   Call caller regarding pt blood sugar and right leg that is causing pt a lot of pain.   [phone]

## 2018-11-08 ENCOUNTER — DOCUMENTATION ONLY (OUTPATIENT)
Dept: FAMILY MEDICINE | Facility: CLINIC | Age: 83
End: 2018-11-08

## 2018-11-08 ENCOUNTER — LAB VISIT (OUTPATIENT)
Dept: LAB | Facility: HOSPITAL | Age: 83
End: 2018-11-08
Attending: FAMILY MEDICINE
Payer: MEDICARE

## 2018-11-08 ENCOUNTER — OFFICE VISIT (OUTPATIENT)
Dept: FAMILY MEDICINE | Facility: CLINIC | Age: 83
End: 2018-11-08
Payer: MEDICARE

## 2018-11-08 VITALS
HEART RATE: 67 BPM | BODY MASS INDEX: 23.71 KG/M2 | TEMPERATURE: 99 F | OXYGEN SATURATION: 97 % | RESPIRATION RATE: 18 BRPM | HEIGHT: 64 IN | SYSTOLIC BLOOD PRESSURE: 100 MMHG | WEIGHT: 138.88 LBS | DIASTOLIC BLOOD PRESSURE: 68 MMHG

## 2018-11-08 DIAGNOSIS — E11.22 CONTROLLED TYPE 2 DIABETES MELLITUS WITH STAGE 3 CHRONIC KIDNEY DISEASE, WITHOUT LONG-TERM CURRENT USE OF INSULIN: Chronic | ICD-10-CM

## 2018-11-08 DIAGNOSIS — E11.22 CONTROLLED TYPE 2 DIABETES MELLITUS WITH STAGE 3 CHRONIC KIDNEY DISEASE, WITHOUT LONG-TERM CURRENT USE OF INSULIN: Primary | Chronic | ICD-10-CM

## 2018-11-08 DIAGNOSIS — R25.2 LEG CRAMPING: ICD-10-CM

## 2018-11-08 DIAGNOSIS — E11.59 HYPERTENSION ASSOCIATED WITH DIABETES: Chronic | ICD-10-CM

## 2018-11-08 DIAGNOSIS — N18.30 CONTROLLED TYPE 2 DIABETES MELLITUS WITH STAGE 3 CHRONIC KIDNEY DISEASE, WITHOUT LONG-TERM CURRENT USE OF INSULIN: Chronic | ICD-10-CM

## 2018-11-08 DIAGNOSIS — N18.30 CONTROLLED TYPE 2 DIABETES MELLITUS WITH STAGE 3 CHRONIC KIDNEY DISEASE, WITHOUT LONG-TERM CURRENT USE OF INSULIN: Primary | Chronic | ICD-10-CM

## 2018-11-08 DIAGNOSIS — I15.2 HYPERTENSION ASSOCIATED WITH DIABETES: Chronic | ICD-10-CM

## 2018-11-08 LAB
ALBUMIN SERPL BCP-MCNC: 3.6 G/DL
ALP SERPL-CCNC: 60 U/L
ALT SERPL W/O P-5'-P-CCNC: 14 U/L
ANION GAP SERPL CALC-SCNC: 11 MMOL/L
AST SERPL-CCNC: 23 U/L
BASOPHILS # BLD AUTO: 0.09 K/UL
BASOPHILS NFR BLD: 1.1 %
BILIRUB SERPL-MCNC: 0.3 MG/DL
BUN SERPL-MCNC: 37 MG/DL
CALCIUM SERPL-MCNC: 10 MG/DL
CHLORIDE SERPL-SCNC: 104 MMOL/L
CO2 SERPL-SCNC: 22 MMOL/L
CREAT SERPL-MCNC: 1.3 MG/DL
DIFFERENTIAL METHOD: ABNORMAL
EOSINOPHIL # BLD AUTO: 1 K/UL
EOSINOPHIL NFR BLD: 12.6 %
ERYTHROCYTE [DISTWIDTH] IN BLOOD BY AUTOMATED COUNT: 13.5 %
EST. GFR  (AFRICAN AMERICAN): 41.7 ML/MIN/1.73 M^2
EST. GFR  (NON AFRICAN AMERICAN): 36.2 ML/MIN/1.73 M^2
GLUCOSE SERPL-MCNC: 168 MG/DL
GLUCOSE SERPL-MCNC: 197 MG/DL (ref 70–110)
HCT VFR BLD AUTO: 35.8 %
HGB BLD-MCNC: 11.7 G/DL
IMM GRANULOCYTES # BLD AUTO: 0.05 K/UL
IMM GRANULOCYTES NFR BLD AUTO: 0.6 %
LYMPHOCYTES # BLD AUTO: 1.5 K/UL
LYMPHOCYTES NFR BLD: 19.3 %
MAGNESIUM SERPL-MCNC: 2.3 MG/DL
MCH RBC QN AUTO: 31.7 PG
MCHC RBC AUTO-ENTMCNC: 32.7 G/DL
MCV RBC AUTO: 97 FL
MONOCYTES # BLD AUTO: 0.8 K/UL
MONOCYTES NFR BLD: 10.3 %
NEUTROPHILS # BLD AUTO: 4.5 K/UL
NEUTROPHILS NFR BLD: 56.1 %
NRBC BLD-RTO: 0 /100 WBC
PLATELET # BLD AUTO: 306 K/UL
PMV BLD AUTO: 12 FL
POTASSIUM SERPL-SCNC: 5.1 MMOL/L
PROT SERPL-MCNC: 6.7 G/DL
RBC # BLD AUTO: 3.69 M/UL
SODIUM SERPL-SCNC: 137 MMOL/L
WBC # BLD AUTO: 7.94 K/UL

## 2018-11-08 PROCEDURE — 80053 COMPREHEN METABOLIC PANEL: CPT | Mod: HCNC

## 2018-11-08 PROCEDURE — 99999 PR PBB SHADOW E&M-EST. PATIENT-LVL III: CPT | Mod: PBBFAC,HCNC,, | Performed by: FAMILY MEDICINE

## 2018-11-08 PROCEDURE — 36415 COLL VENOUS BLD VENIPUNCTURE: CPT | Mod: HCNC,PO

## 2018-11-08 PROCEDURE — 99214 OFFICE O/P EST MOD 30 MIN: CPT | Mod: HCNC,S$GLB,, | Performed by: FAMILY MEDICINE

## 2018-11-08 PROCEDURE — 82948 REAGENT STRIP/BLOOD GLUCOSE: CPT | Mod: HCNC,S$GLB,, | Performed by: FAMILY MEDICINE

## 2018-11-08 PROCEDURE — 85025 COMPLETE CBC W/AUTO DIFF WBC: CPT | Mod: HCNC

## 2018-11-08 PROCEDURE — 83735 ASSAY OF MAGNESIUM: CPT | Mod: HCNC

## 2018-11-08 PROCEDURE — 1101F PT FALLS ASSESS-DOCD LE1/YR: CPT | Mod: CPTII,HCNC,S$GLB, | Performed by: FAMILY MEDICINE

## 2018-11-08 RX ORDER — FUROSEMIDE 20 MG/1
TABLET ORAL
Qty: 180 TABLET | Refills: 0
Start: 2018-11-08 | End: 2019-01-16 | Stop reason: SDUPTHER

## 2018-11-08 NOTE — PROGRESS NOTES
CHIEF COMPLAINT:  This is a 89-year-old female complaining of right thigh cramping.    SUBJECTIVE:  The patient is brought in by her PCA.  She has been having intermittent pain in her right thigh which she describes as a cramping sensation.  Sometimes she refuses to go to therapy because of pain in her leg.  It is unclear if she is drinking enough water during the day.  Recently, her blood sugars have been elevated with readings in the 170s to 180s both fasting and nonfasting.  Highest reading has been in low 200s.   Her weight is stable and her diet are apparently unchanged.  Patient denies polyuria, polydipsia or polyphagia. She takes glimepiride 2 mg with breakfast.  Last A1c was 5.3% 6 months ago.   She takes lisinopril 10 mg daily for hypertension which is controlled.   Patient is very inactive.    ROS:  GENERAL: Patient denies fever, chills, night sweats. Patient denies weight gain. Patient denies anorexia, fatigue, weakness or swollen glands.  SKIN: Patient denies rash or hair loss.  HEENT: Patient denies sore throat, ear pain, hearing loss, nasal congestion, or runny nose. Patient denies visual disturbance, eye irritation or discharge.  LUNGS: Patient denies cough, wheeze or hemoptysis.  CARDIOVASCULAR: Patient denies chest pain, shortness of breath, palpitations, syncope or lower extremity edema.  GI: Patient denies abdominal pain, nausea, vomiting, blood in stool or melena.  Positive for constipation and diarrhea.  GENITOURINARY: Patient denies dysuria, frequency, hematuria, nocturia, urgency or incontinence.  MUSCULOSKELETAL: Patient denies joint pain, swelling, redness or warmth.  NEUROLOGIC: Patient denies headache, vertigo, paresthesias, weakness in limb, dysarthria, dysphagia or abnormality of gait.  PSYCHIATRIC: Patient denies anxiety, depression, or memory loss.     OBJECTIVE:   GENERAL: Well-developed well-nourished elderly white female alert and oriented x3, in no acute distress. Memory, judgment  and cognition without deficit.  Weight loss of 15 pounds in the last year.  Patient is seated in wheelchair.  Accompanied by PCA.  SKIN: Clear without rash. Normal color and tone.  HEENT: Eyes: Clear conjunctivae. No scleral icterus.  No scleral icterus. Ears: Clear canals. Clear TMs. Nose: Without congestion. Pharynx: Without injection or exudates.  NECK: Supple, normal range of motion. No masses, lymphadenopathy or enlarged thyroid. No JVD. Carotids 2+ and equal. No bruits.  LUNGS: Clear to auscultation. Normal respiratory effort.  CARDIOVASCULAR: Regular rhythm, normal S1, S2 without murmur, gallop or rub.  BACK: No CVA or spinal tenderness.  ABDOMEN:  Soft, nontender without mass or organomegaly. No rebound or guarding.  EXTREMITIES: Without cyanosis, clubbing or edema. Distal pulses 2+ and equal. Normal range of motion in all extremities. No joint effusion, erythema or warmth.  No palpable tenderness.  NEUROLOGIC:  Motor strength equal bilaterally. Sensation normal to touch. Gait unsteady without walker. No tremor.     Random blood sugar = 197.    ASSESSMENT:  1. Controlled type 2 diabetes mellitus with stage 3 chronic kidney disease, without long-term current use of insulin    2. Leg cramping    3. Hypertension associated with diabetes      PLAN:   1.  Increase glimepiride to 4 mg daily with breakfast.  2.  Monitor BS daily and report readings in 2 weeks.  3.  Increase daily water intake.  4.  Restart physical therapy.  Stretch legs regularly.  5.  Check CBC, CMP, magnesium.  6.  Discussed low fat, limited carbohydrate diet.  Avoid concentrated sweets.  7.  Follow up in 6 months.

## 2018-11-14 DIAGNOSIS — R60.0 LEG EDEMA: ICD-10-CM

## 2018-11-14 RX ORDER — POTASSIUM CHLORIDE 750 MG/1
10 CAPSULE, EXTENDED RELEASE ORAL DAILY
Qty: 30 CAPSULE | Refills: 5 | Status: SHIPPED | OUTPATIENT
Start: 2018-11-14 | End: 2019-10-11 | Stop reason: SDUPTHER

## 2018-11-19 ENCOUNTER — TELEPHONE (OUTPATIENT)
Dept: FAMILY MEDICINE | Facility: CLINIC | Age: 83
End: 2018-11-19

## 2018-11-19 NOTE — TELEPHONE ENCOUNTER
----- Message from Nettie Negro sent at 11/19/2018  2:58 PM CST -----  Contact: Lori- [facility]  Lori is calling in regards to pt's medication Amaryl. Pt has finished 2 mg tablets and is ready for the 4 mg tablets. Please call Lori back at [phone].        Thanks,   Nettie Negro

## 2018-11-19 NOTE — TELEPHONE ENCOUNTER
Attempted to reach Lori at [facility], she is currently out of office. Left message to return call

## 2018-11-20 RX ORDER — GLIMEPIRIDE 4 MG/1
4 TABLET ORAL
Qty: 30 TABLET | Refills: 5 | Status: SHIPPED | OUTPATIENT
Start: 2018-11-20 | End: 2019-10-11

## 2018-11-20 NOTE — TELEPHONE ENCOUNTER
Spoke with the nurse Lisa at [facility] she stated pt has finished the 2mg tables of glimperide and is now ready for the 4mg tablets. I'm only showing the 2mg tablets on medication list.

## 2018-11-20 NOTE — TELEPHONE ENCOUNTER
----- Message from Denis Gustafson sent at 11/20/2018  1:15 PM CST -----  Contact: Lisa whyte/ [facility] [phone]  Returning a missed call from the nurse that was left for Lori but she is gone for the day and Lisa is the other nurse that is with the pt.

## 2018-12-18 ENCOUNTER — TELEPHONE (OUTPATIENT)
Dept: FAMILY MEDICINE | Facility: CLINIC | Age: 83
End: 2018-12-18

## 2018-12-18 NOTE — TELEPHONE ENCOUNTER
Spoke with filiberto from [facility] and informed her that it is ok for pt to take dayquil with understanding.

## 2018-12-18 NOTE — TELEPHONE ENCOUNTER
----- Message from Soco Fernandez sent at 12/18/2018  9:26 AM CST -----  Contact: AUGUST Sandoval, [facility]-  [phone]  Would like to know if it is okay for patient to take over the counter Dayquil cold medication.  Please call back at [phone].  Md Rachael

## 2018-12-18 NOTE — TELEPHONE ENCOUNTER
Lori from [facility] wants to know if they can give pt Dayquil. She stated that the family bought it for the pt and didn't know if they could give it to her for a runny nose, cough, and sore throat. She also states that she is experiencing no fever,no change in appetite.

## 2018-12-18 NOTE — TELEPHONE ENCOUNTER
----- Message from Kelsey Hills sent at 12/18/2018 12:42 PM CST -----  Contact: [facility] (Lori)  Calling in regards to a missed call and please advise [phone].

## 2018-12-28 ENCOUNTER — TELEPHONE (OUTPATIENT)
Dept: FAMILY MEDICINE | Facility: CLINIC | Age: 83
End: 2018-12-28

## 2018-12-28 NOTE — TELEPHONE ENCOUNTER
----- Message from Denis Gustafson sent at 12/28/2018 11:54 AM CST -----  Contact: Cynthia [phone]  Per daughter mom can not sit for over an hour after a nurse visit. Please contact to set a time and date so that she can come in. Please cancel nurse visit on 1/3. Thanks

## 2019-01-03 ENCOUNTER — LAB VISIT (OUTPATIENT)
Dept: LAB | Facility: HOSPITAL | Age: 84
End: 2019-01-03
Payer: MEDICARE

## 2019-01-03 ENCOUNTER — DOCUMENTATION ONLY (OUTPATIENT)
Dept: FAMILY MEDICINE | Facility: CLINIC | Age: 84
End: 2019-01-03

## 2019-01-03 ENCOUNTER — OFFICE VISIT (OUTPATIENT)
Dept: FAMILY MEDICINE | Facility: CLINIC | Age: 84
End: 2019-01-03
Payer: MEDICARE

## 2019-01-03 VITALS
SYSTOLIC BLOOD PRESSURE: 110 MMHG | DIASTOLIC BLOOD PRESSURE: 60 MMHG | WEIGHT: 140.44 LBS | BODY MASS INDEX: 23.98 KG/M2 | OXYGEN SATURATION: 99 % | HEIGHT: 64 IN | RESPIRATION RATE: 18 BRPM | TEMPERATURE: 98 F | HEART RATE: 63 BPM

## 2019-01-03 DIAGNOSIS — N18.30 CONTROLLED TYPE 2 DIABETES MELLITUS WITH STAGE 3 CHRONIC KIDNEY DISEASE, WITHOUT LONG-TERM CURRENT USE OF INSULIN: Primary | ICD-10-CM

## 2019-01-03 DIAGNOSIS — E11.22 CONTROLLED TYPE 2 DIABETES MELLITUS WITH STAGE 3 CHRONIC KIDNEY DISEASE, WITHOUT LONG-TERM CURRENT USE OF INSULIN: Primary | ICD-10-CM

## 2019-01-03 DIAGNOSIS — H35.3290 EXUDATIVE AGE-RELATED MACULAR DEGENERATION, UNSPECIFIED LATERALITY, UNSPECIFIED STAGE: ICD-10-CM

## 2019-01-03 DIAGNOSIS — N18.30 CKD (CHRONIC KIDNEY DISEASE) STAGE 3, GFR 30-59 ML/MIN: Chronic | ICD-10-CM

## 2019-01-03 DIAGNOSIS — E11.22 CONTROLLED TYPE 2 DIABETES MELLITUS WITH STAGE 3 CHRONIC KIDNEY DISEASE, WITHOUT LONG-TERM CURRENT USE OF INSULIN: ICD-10-CM

## 2019-01-03 DIAGNOSIS — N18.30 CONTROLLED TYPE 2 DIABETES MELLITUS WITH STAGE 3 CHRONIC KIDNEY DISEASE, WITHOUT LONG-TERM CURRENT USE OF INSULIN: ICD-10-CM

## 2019-01-03 PROBLEM — S32.9XXA CLOSED NONDISPLACED FRACTURE OF PELVIS: Status: RESOLVED | Noted: 2018-04-30 | Resolved: 2019-01-03

## 2019-01-03 PROBLEM — S32.019A CLOSED FRACTURE OF FIRST LUMBAR VERTEBRA: Status: RESOLVED | Noted: 2018-04-30 | Resolved: 2019-01-03

## 2019-01-03 LAB
ANION GAP SERPL CALC-SCNC: 12 MMOL/L
BUN SERPL-MCNC: 42 MG/DL
CALCIUM SERPL-MCNC: 9.9 MG/DL
CHLORIDE SERPL-SCNC: 103 MMOL/L
CO2 SERPL-SCNC: 24 MMOL/L
CREAT SERPL-MCNC: 1.7 MG/DL
EST. GFR  (AFRICAN AMERICAN): 30.2 ML/MIN/1.73 M^2
EST. GFR  (NON AFRICAN AMERICAN): 26.2 ML/MIN/1.73 M^2
ESTIMATED AVG GLUCOSE: 166 MG/DL
GLUCOSE SERPL-MCNC: 206 MG/DL
HBA1C MFR BLD HPLC: 7.4 %
POTASSIUM SERPL-SCNC: 5.2 MMOL/L
SODIUM SERPL-SCNC: 139 MMOL/L

## 2019-01-03 PROCEDURE — 36415 COLL VENOUS BLD VENIPUNCTURE: CPT | Mod: HCNC,PO

## 2019-01-03 PROCEDURE — 99999 PR PBB SHADOW E&M-EST. PATIENT-LVL III: ICD-10-PCS | Mod: PBBFAC,HCNC,, | Performed by: FAMILY MEDICINE

## 2019-01-03 PROCEDURE — 99499 RISK ADDL DX/OHS AUDIT: ICD-10-PCS | Mod: HCNC,S$GLB,, | Performed by: FAMILY MEDICINE

## 2019-01-03 PROCEDURE — 1101F PT FALLS ASSESS-DOCD LE1/YR: CPT | Mod: CPTII,HCNC,S$GLB, | Performed by: FAMILY MEDICINE

## 2019-01-03 PROCEDURE — 99499 UNLISTED E&M SERVICE: CPT | Mod: HCNC,S$GLB,, | Performed by: FAMILY MEDICINE

## 2019-01-03 PROCEDURE — 99214 PR OFFICE/OUTPT VISIT, EST, LEVL IV, 30-39 MIN: ICD-10-PCS | Mod: HCNC,S$GLB,, | Performed by: FAMILY MEDICINE

## 2019-01-03 PROCEDURE — 83036 HEMOGLOBIN GLYCOSYLATED A1C: CPT | Mod: HCNC

## 2019-01-03 PROCEDURE — 99214 OFFICE O/P EST MOD 30 MIN: CPT | Mod: HCNC,S$GLB,, | Performed by: FAMILY MEDICINE

## 2019-01-03 PROCEDURE — 80048 BASIC METABOLIC PNL TOTAL CA: CPT | Mod: HCNC

## 2019-01-03 PROCEDURE — 99999 PR PBB SHADOW E&M-EST. PATIENT-LVL III: CPT | Mod: PBBFAC,HCNC,, | Performed by: FAMILY MEDICINE

## 2019-01-03 PROCEDURE — 1101F PR PT FALLS ASSESS DOC 0-1 FALLS W/OUT INJ PAST YR: ICD-10-PCS | Mod: CPTII,HCNC,S$GLB, | Performed by: FAMILY MEDICINE

## 2019-01-04 ENCOUNTER — TELEPHONE (OUTPATIENT)
Dept: FAMILY MEDICINE | Facility: CLINIC | Age: 84
End: 2019-01-04

## 2019-01-04 NOTE — TELEPHONE ENCOUNTER
----- Message from Kelsey Hills sent at 1/4/2019  1:56 PM CST -----  Contact: pt daughter Cynthia  Calling in regards to returning your call  And dont know why and please advise [phone] or [phone]

## 2019-01-08 ENCOUNTER — TELEPHONE (OUTPATIENT)
Dept: FAMILY MEDICINE | Facility: CLINIC | Age: 84
End: 2019-01-08

## 2019-01-08 NOTE — TELEPHONE ENCOUNTER
Was in hospital for diarrhea last Saturday (1-5-19).  Diarrhea has gone away completely but would like to know if she needs to have a follow up appointment.      ----- Message from Lidia Cleveland sent at 1/8/2019 10:46 AM CST -----  Esme was calling to see if the doctor received paperwork from [facility] from when pt was in the hospital and if a f/u is needed since pt just saw the doctor last Thursday and went to the hospital Saturday.     [phone]

## 2019-01-09 ENCOUNTER — TELEPHONE (OUTPATIENT)
Dept: FAMILY MEDICINE | Facility: CLINIC | Age: 84
End: 2019-01-09

## 2019-01-09 NOTE — TELEPHONE ENCOUNTER
Pt's caregiver Esme from [facility], states that pt went to the ER at [facility] yesterday night for Diarrhea, and states that it was resolved. She wants to know if pt needs to come in for a ER f/u even though pt is doing fine now.

## 2019-01-09 NOTE — PROGRESS NOTES
CHIEF COMPLAINT:  This is a 89-year-old female here for follow-up chronic medical conditions including diabetes.    SUBJECTIVE:  The patient has type 2 diabetes.  Recent Accu-Chek log shows fasting readings in the 180s to 190s.  Postprandial readings are 200-250.  Patient denies polyuria, polydipsia or polyphagia.  She takes glimepiride 4 mg with breakfast.  Last A1c was 5.3% 8 months ago.   Patient's weight is essentially unchanged.  She is not following ADA diet.  Patient is very inactive.  Patient has chronic kidney disease stage 3 which has declined in the last 2-3 months.  GFR is 36.2.  Patient admits to drinking minimal water during the day.  She reports that she drinks Gatorade.  Patient has age related macular degeneration for which she is followed by her ophthalmologist regularly.     ROS:  GENERAL: Patient denies fever, chills, night sweats. Patient denies weight gain. Patient denies anorexia, fatigue, weakness or swollen glands.  SKIN: Patient denies rash or hair loss.  HEENT: Patient denies sore throat, ear pain, hearing loss, nasal congestion, or runny nose. Patient denies visual disturbance, eye irritation or discharge.  LUNGS: Patient denies cough, wheeze or hemoptysis.  CARDIOVASCULAR: Patient denies chest pain, shortness of breath, palpitations, syncope or lower extremity edema.  GI: Patient denies abdominal pain, nausea, vomiting, blood in stool or melena.  Positive for constipation and diarrhea.  GENITOURINARY: Patient denies dysuria, frequency, hematuria, nocturia, urgency or incontinence.  MUSCULOSKELETAL: Patient denies joint pain, swelling, redness or warmth.  NEUROLOGIC: Patient denies headache, vertigo, paresthesias, weakness in limb, dysarthria, dysphagia or abnormality of gait.  PSYCHIATRIC: Patient denies anxiety, depression, or memory loss.     OBJECTIVE:   GENERAL: Well-developed well-nourished elderly white female alert and oriented x3, in no acute distress. Memory, judgment and  cognition without deficit.  Patient is seated in wheelchair.  Accompanied by PCA.  SKIN: Clear without rash. Normal color and tone.  HEENT: Eyes: Clear conjunctivae. No scleral icterus.  No scleral icterus. Ears: Clear canals. Clear TMs. Nose: Without congestion. Pharynx: Without injection or exudates.  NECK: Supple, normal range of motion. No masses, lymphadenopathy or enlarged thyroid. No JVD. Carotids 2+ and equal. No bruits.  LUNGS: Clear to auscultation. Normal respiratory effort.  CARDIOVASCULAR: Regular rhythm, normal S1, S2 without murmur, gallop or rub.  BACK: No CVA or spinal tenderness.  ABDOMEN:  Soft, nontender without mass or organomegaly. No rebound or guarding.  EXTREMITIES: Without cyanosis, clubbing or edema. Distal pulses 2+ and equal. Normal range of motion in all extremities. No joint effusion, erythema or warmth.  No palpable tenderness.  NEUROLOGIC:  Motor strength equal bilaterally. Sensation normal to touch. Gait unsteady without walker. No tremor.     ASSESSMENT:  1. Controlled type 2 diabetes mellitus with stage 3 chronic kidney disease, without long-term current use of insulin    2. CKD (chronic kidney disease) stage 3, GFR 30-59 ml/min    3. Exudative age-related macular degeneration, unspecified laterality, unspecified stage      PLAN:   1.  Check BMP and A1c.  2.  Increase daily water intake to 48-64 oz.  3.  Add Januvia 50 mg daily.  4.  Follow-up in 3 months.    This visit was 25 min greater than 50% of which involved counseling.

## 2019-01-09 NOTE — TELEPHONE ENCOUNTER
Yes they were informed and pt's caregiver Esme stated that she has been trying to get pt to drink more water, but is hard for her to get the pt on board with drinking more water, she states that she will be getting her recheck on labs tomorrow and will have results faxed over.

## 2019-01-09 NOTE — TELEPHONE ENCOUNTER
----- Message from Julia Navarro sent at 1/9/2019  1:05 PM CST -----  Contact: Esme- Care giver  Ms Esme needs to know if the papers from [facility] have been received, please call her back at [phone]. Thank you

## 2019-01-09 NOTE — TELEPHONE ENCOUNTER
No but did they receive my previous message:     Blood work showed worsening kidney function.  I wanted to drink 48-64 oz of water a day and let's recheck blood work in 1 week to see if there is any improvement.  Diabetic control is 7.4% which has worsened in the last 6-8 months.  Please avoid concentrated sweets or sugary drinks.    Now let's repeat lab in one week.  Make sure she is drinking water as stated.

## 2019-01-16 DIAGNOSIS — E11.59 HYPERTENSION ASSOCIATED WITH DIABETES: Chronic | ICD-10-CM

## 2019-01-16 DIAGNOSIS — I15.2 HYPERTENSION ASSOCIATED WITH DIABETES: Chronic | ICD-10-CM

## 2019-01-16 RX ORDER — FUROSEMIDE 20 MG/1
TABLET ORAL
Qty: 180 TABLET | Refills: 1
Start: 2019-01-16 | End: 2019-04-29

## 2019-01-16 NOTE — TELEPHONE ENCOUNTER
----- Message from Hermilo Jamison sent at 1/16/2019  1:37 PM CST -----  ..1. What is the name of the medication you are requesting? lasix  2. What is the dose? 20 mg   3. How do you take the medication? Orally, topically, etc? Orally   4. How often do you take this medication? Daily   5. Do you need a 30 day or 90 day supply? 30  6. How many refills are you requesting? 1  7. What is your preferred pharmacy and location of the pharmacy? ..      [facility] [address] [address]  Phone: [phone] Fax: [phone]    8. Who can we contact with further questions? Please call Haylee ( Pharmacy ) [phone]

## 2019-03-06 ENCOUNTER — PES CALL (OUTPATIENT)
Dept: ADMINISTRATIVE | Facility: CLINIC | Age: 84
End: 2019-03-06

## 2019-04-09 ENCOUNTER — OFFICE VISIT (OUTPATIENT)
Dept: FAMILY MEDICINE | Facility: CLINIC | Age: 84
End: 2019-04-09
Payer: MEDICARE

## 2019-04-09 VITALS
OXYGEN SATURATION: 98 % | BODY MASS INDEX: 24.42 KG/M2 | HEART RATE: 78 BPM | HEIGHT: 64 IN | SYSTOLIC BLOOD PRESSURE: 112 MMHG | DIASTOLIC BLOOD PRESSURE: 62 MMHG | WEIGHT: 143.06 LBS | TEMPERATURE: 98 F

## 2019-04-09 DIAGNOSIS — E78.5 HYPERLIPIDEMIA ASSOCIATED WITH TYPE 2 DIABETES MELLITUS: Chronic | ICD-10-CM

## 2019-04-09 DIAGNOSIS — E11.69 HYPERLIPIDEMIA ASSOCIATED WITH TYPE 2 DIABETES MELLITUS: Chronic | ICD-10-CM

## 2019-04-09 DIAGNOSIS — E11.59 HYPERTENSION ASSOCIATED WITH DIABETES: Chronic | ICD-10-CM

## 2019-04-09 DIAGNOSIS — I15.2 HYPERTENSION ASSOCIATED WITH DIABETES: Chronic | ICD-10-CM

## 2019-04-09 DIAGNOSIS — Z00.00 PREVENTATIVE HEALTH CARE: Primary | ICD-10-CM

## 2019-04-09 DIAGNOSIS — N18.30 CKD (CHRONIC KIDNEY DISEASE) STAGE 3, GFR 30-59 ML/MIN: Chronic | ICD-10-CM

## 2019-04-09 DIAGNOSIS — E11.65 UNCONTROLLED TYPE 2 DIABETES MELLITUS WITH HYPERGLYCEMIA: Chronic | ICD-10-CM

## 2019-04-09 PROCEDURE — 99999 PR PBB SHADOW E&M-EST. PATIENT-LVL III: CPT | Mod: PBBFAC,HCNC,, | Performed by: FAMILY MEDICINE

## 2019-04-09 PROCEDURE — 99397 PR PREVENTIVE VISIT,EST,65 & OVER: ICD-10-PCS | Mod: HCNC,S$GLB,, | Performed by: FAMILY MEDICINE

## 2019-04-09 PROCEDURE — 99999 PR PBB SHADOW E&M-EST. PATIENT-LVL III: ICD-10-PCS | Mod: PBBFAC,HCNC,, | Performed by: FAMILY MEDICINE

## 2019-04-09 PROCEDURE — 99397 PER PM REEVAL EST PAT 65+ YR: CPT | Mod: HCNC,S$GLB,, | Performed by: FAMILY MEDICINE

## 2019-04-09 NOTE — PROGRESS NOTES
CHIEF COMPLAINT:  This is a 89-year-old female here for preventive health exam.    SUBJECTIVE:  The patient is accompanied today by her sitter.  Patient has uncontrolled type 2 diabetes and currently takes glimepiride 4 mg with breakfast and Januvia 50 mg daily.  Her blood sugar record shows blood sugars in the 160-250 range.  She denies polyuria, polydipsia or polyphagia.  Last A1c was 7.4% 3 months ago.  Patient drinks electrolyte solutions without sugar.  She takes lisinopril 10 mg daily for hypertension and simvastatin 40 mg daily for hyperlipidemia.  She has chronic kidney disease stage 3.  She takes Lasix 20 mg twice daily for hypertension and lower extremity edema.  She takes Wellbutrin  mg twice daily for smoking cessation.  She has effectively stop smoking since November 2017.  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  She has mild bilateral hearing loss.  Patient continues to have intermittent loose stools.  She is taking stool softener twice a day.  She has osteoporosis but refuses treatment.  She has had no recent falls.  She uses her walker to ambulate to the dining area at assisted living facility.  This sitter also takes her for a walk inside the facility once a day.    Eye exam January 2018. Mammogram 2008. Colonoscopy May 2011. Bone DEXA scan July 2013 (patient refuses). Pneumovax May 2011. Flu vaccine October 2018.   Prevnar April 2015.     ROS:  GENERAL: Patient denies fever, chills, night sweats. Patient denies weight gain or loss. Patient denies anorexia, fatigue, weakness or swollen glands.  SKIN: Patient denies rash or hair loss.  HEENT: Patient denies sore throat, ear pain, hearing loss, nasal congestion, or runny nose. Patient denies visual disturbance, eye irritation or discharge.  LUNGS: Patient denies cough, wheeze or hemoptysis.  CARDIOVASCULAR: Patient denies chest pain, shortness of breath, palpitations, syncope or lower extremity edema.  GI: Patient denies  abdominal pain, nausea, vomiting, blood in stool or melena.  Positive for constipation and diarrhea.  GENITOURINARY: Patient denies pelvic pain, vaginal discharge, itch or odor. Patient denies irregular vaginal bleeding. Patient denies dysuria, frequency, hematuria, nocturia, urgency or incontinence.  BREASTS: Patient denies breast pain, mass or nipple discharge.  MUSCULOSKELETAL: Patient denies joint pain, swelling, redness or warmth.  NEUROLOGIC: Patient denies headache, vertigo, paresthesias, weakness in limb, dysarthria, dysphagia or abnormality of gait.  PSYCHIATRIC: Patient denies anxiety, depression, or memory loss.     OBJECTIVE:   GENERAL: Well-developed well-nourished elderly white female alert and oriented x3, in no acute distress. Memory, judgment and cognition without deficit.  Accompanied by sitter.  SKIN: Clear without rash. Normal color and tone.  HEENT: Eyes: Clear conjunctivae. No scleral icterus.  No scleral icterus. Ears: Clear canals. Clear TMs. Nose: Without congestion. Pharynx: Without injection or exudates.  NECK: Supple, normal range of motion. No masses, lymphadenopathy or enlarged thyroid. No JVD. Carotids 2+ and equal. No bruits.  LUNGS: Clear to auscultation. Normal respiratory effort.  CARDIOVASCULAR: Regular rhythm, normal S1, S2 without murmur, gallop or rub.  BREASTS:  No masses, nontender no nipple discharge.  BACK: No CVA or spinal tenderness.  ABDOMEN: Normal appearance. Active bowel sounds. Soft, nontender without mass or organomegaly. No rebound or guarding.  EXTREMITIES: Without cyanosis, clubbing or edema. Distal pulses 2+ and equal. Normal range of motion in all extremities. No joint effusion, erythema or warmth.  NEUROLOGIC: Cranial nerves II through XII without deficit. Motor strength equal bilaterally. Sensation normal to touch. Deep tendon reflexes 2+ and equal. Gait unsteady without walker. No tremor. Negative cerebellar signs.   FOOT EVALUATION: 10 gram monofilament  exam with protective sensation intact bilaterally. Nails appropriately trimmed. No ulcers. Distal pulses palpable.    PELVIC: Deferred.    ASSESSMENT:  1. Preventative health care    2. Uncontrolled type 2 diabetes mellitus with hyperglycemia    3. Hypertension associated with diabetes    4. Hyperlipidemia associated with type 2 diabetes mellitus    5. CKD (chronic kidney disease) stage 3, GFR 30-59 ml/min      PLAN:   1.  Stay active.  Exercise regularly.  2.  Age-appropriate counseling.  3.  Fasting lab.  4.  Discontinue stool softeners.  5.  Take chewable fiber gummies daily.  6.  Encouraged increased water intake.    This note is generated with speech recognition software and is subject to transcription error and sound alike phrases that may be missed by proofreading.

## 2019-04-11 ENCOUNTER — LAB VISIT (OUTPATIENT)
Dept: LAB | Facility: HOSPITAL | Age: 84
End: 2019-04-11
Attending: FAMILY MEDICINE
Payer: MEDICARE

## 2019-04-11 DIAGNOSIS — E11.59 HYPERTENSION ASSOCIATED WITH DIABETES: Chronic | ICD-10-CM

## 2019-04-11 DIAGNOSIS — I15.2 HYPERTENSION ASSOCIATED WITH DIABETES: Chronic | ICD-10-CM

## 2019-04-11 PROCEDURE — 84443 ASSAY THYROID STIM HORMONE: CPT | Mod: HCNC

## 2019-04-11 PROCEDURE — 85025 COMPLETE CBC W/AUTO DIFF WBC: CPT | Mod: HCNC

## 2019-04-11 PROCEDURE — 80061 LIPID PANEL: CPT | Mod: HCNC

## 2019-04-11 PROCEDURE — 36415 COLL VENOUS BLD VENIPUNCTURE: CPT | Mod: HCNC,PO

## 2019-04-11 PROCEDURE — 80053 COMPREHEN METABOLIC PANEL: CPT | Mod: HCNC

## 2019-04-12 LAB
ALBUMIN SERPL BCP-MCNC: 3.9 G/DL (ref 3.5–5.2)
ALP SERPL-CCNC: 63 U/L (ref 55–135)
ALT SERPL W/O P-5'-P-CCNC: 15 U/L (ref 10–44)
ANION GAP SERPL CALC-SCNC: 12 MMOL/L (ref 8–16)
AST SERPL-CCNC: 18 U/L (ref 10–40)
BASOPHILS # BLD AUTO: 0.09 K/UL (ref 0–0.2)
BASOPHILS NFR BLD: 1.3 % (ref 0–1.9)
BILIRUB SERPL-MCNC: 0.3 MG/DL (ref 0.1–1)
BUN SERPL-MCNC: 43 MG/DL (ref 8–23)
CALCIUM SERPL-MCNC: 10 MG/DL (ref 8.7–10.5)
CHLORIDE SERPL-SCNC: 104 MMOL/L (ref 95–110)
CHOLEST SERPL-MCNC: 163 MG/DL (ref 120–199)
CHOLEST/HDLC SERPL: 3.4 {RATIO} (ref 2–5)
CO2 SERPL-SCNC: 22 MMOL/L (ref 23–29)
CREAT SERPL-MCNC: 1.9 MG/DL (ref 0.5–1.4)
DIFFERENTIAL METHOD: ABNORMAL
EOSINOPHIL # BLD AUTO: 0.6 K/UL (ref 0–0.5)
EOSINOPHIL NFR BLD: 8.8 % (ref 0–8)
ERYTHROCYTE [DISTWIDTH] IN BLOOD BY AUTOMATED COUNT: 12.9 % (ref 11.5–14.5)
EST. GFR  (AFRICAN AMERICAN): 26.4 ML/MIN/1.73 M^2
EST. GFR  (NON AFRICAN AMERICAN): 22.9 ML/MIN/1.73 M^2
GLUCOSE SERPL-MCNC: 246 MG/DL (ref 70–110)
HCT VFR BLD AUTO: 40 % (ref 37–48.5)
HDLC SERPL-MCNC: 48 MG/DL (ref 40–75)
HDLC SERPL: 29.4 % (ref 20–50)
HGB BLD-MCNC: 12.9 G/DL (ref 12–16)
IMM GRANULOCYTES # BLD AUTO: 0.03 K/UL (ref 0–0.04)
IMM GRANULOCYTES NFR BLD AUTO: 0.4 % (ref 0–0.5)
LDLC SERPL CALC-MCNC: 82 MG/DL (ref 63–159)
LYMPHOCYTES # BLD AUTO: 1.3 K/UL (ref 1–4.8)
LYMPHOCYTES NFR BLD: 19.2 % (ref 18–48)
MCH RBC QN AUTO: 32.5 PG (ref 27–31)
MCHC RBC AUTO-ENTMCNC: 32.3 G/DL (ref 32–36)
MCV RBC AUTO: 101 FL (ref 82–98)
MONOCYTES # BLD AUTO: 0.7 K/UL (ref 0.3–1)
MONOCYTES NFR BLD: 10.4 % (ref 4–15)
NEUTROPHILS # BLD AUTO: 4.1 K/UL (ref 1.8–7.7)
NEUTROPHILS NFR BLD: 59.9 % (ref 38–73)
NONHDLC SERPL-MCNC: 115 MG/DL
NRBC BLD-RTO: 0 /100 WBC
PLATELET # BLD AUTO: 288 K/UL (ref 150–350)
PMV BLD AUTO: 12.4 FL (ref 9.2–12.9)
POTASSIUM SERPL-SCNC: 5 MMOL/L (ref 3.5–5.1)
PROT SERPL-MCNC: 7 G/DL (ref 6–8.4)
RBC # BLD AUTO: 3.97 M/UL (ref 4–5.4)
SODIUM SERPL-SCNC: 138 MMOL/L (ref 136–145)
TRIGL SERPL-MCNC: 165 MG/DL (ref 30–150)
TSH SERPL DL<=0.005 MIU/L-ACNC: 1.27 UIU/ML (ref 0.4–4)
WBC # BLD AUTO: 6.92 K/UL (ref 3.9–12.7)

## 2019-04-15 ENCOUNTER — TELEPHONE (OUTPATIENT)
Dept: FAMILY MEDICINE | Facility: CLINIC | Age: 84
End: 2019-04-15

## 2019-04-15 DIAGNOSIS — N18.4 CKD (CHRONIC KIDNEY DISEASE) STAGE 4, GFR 15-29 ML/MIN: Primary | ICD-10-CM

## 2019-04-15 NOTE — TELEPHONE ENCOUNTER
----- Message from Nathalia Dunlap MD sent at 4/13/2019 12:44 PM CDT -----  Contact the patient or her family:  Her kidney function has declined and I would like her to see a nephrologist.  Her diabetes is not controlled and I would like to start insulin injection at least once a day.  Is there is someone that can give her injections?

## 2019-04-15 NOTE — TELEPHONE ENCOUNTER
The patient is no longer on metformin due to kidney function.  She takes glimepiride and Januvia which would be continued for now.  Home health cannot be ordered just to give insulin injection.  Medicare will only cover home health for an acute situation for a limited period of time.  We would have to come up with another solution.    Consult to Nephrology ordered.

## 2019-04-15 NOTE — TELEPHONE ENCOUNTER
Cynthia Ford advised that they will go to the [facility]- best days are Mondays, Tuesdays, and Fridays, mid-morning to noon.   She also advised that they don't have anyone to administer the insulin to Mrs. Durbin so Home Health would have to administer it. She asked if the Metformin will be discontinued. Also the nurses who administer the metformin will need to be notified of any medication changes, and requested that we also fax any new orders to [facility].

## 2019-04-15 NOTE — TELEPHONE ENCOUNTER
----- Message from Jhoana Camacho MA sent at 4/15/2019 11:03 AM CDT -----  Spoke with pt's daughter and informed her about pt's results. She states that she will be making a phone call to her brother to see if he has a nephrologist that he wants her to go to and will give us a call back. Also she states that she doesn't know about the insulin just yet because she doesn't know who can give the injection everyday, but she states that she will contact [facility] to see if there is someone that can give her the injection and will give us call back with that information as well.

## 2019-04-15 NOTE — TELEPHONE ENCOUNTER
Called Cynthia Ford to advise of doctor's note related to labs and kidney function. No answer; no voicemail./OSWALD

## 2019-04-16 ENCOUNTER — TELEPHONE (OUTPATIENT)
Dept: FAMILY MEDICINE | Facility: CLINIC | Age: 84
End: 2019-04-16

## 2019-04-16 RX ORDER — INSULIN GLARGINE 100 [IU]/ML
10 INJECTION, SOLUTION SUBCUTANEOUS NIGHTLY
Qty: 1 BOX | Refills: 5 | Status: SHIPPED | OUTPATIENT
Start: 2019-04-16 | End: 2019-10-11 | Stop reason: SDUPTHER

## 2019-04-16 RX ORDER — BLOOD SUGAR DIAGNOSTIC
1 STRIP MISCELLANEOUS NIGHTLY
Qty: 50 EACH | Refills: 5 | Status: SHIPPED | OUTPATIENT
Start: 2019-04-16 | End: 2021-02-11

## 2019-04-16 NOTE — TELEPHONE ENCOUNTER
----- Message from Lisa Cristofer sent at 4/16/2019  4:15 PM CDT -----  Contact: Lori/[facility]  Lori is calling to check on the status of the Insulin orders that she is still waiting on, Please call her at [phone] and or fax to it [phone].    [facility]  [address] [place] [address] [facility] [address]  Phone: [phone] Fax: [phone]    Thanks  Td

## 2019-04-16 NOTE — TELEPHONE ENCOUNTER
Spoke with filiberto from [facility], she states that they do not need an order for them to give pt her insulin, she states that she just needs vials and needles to be sent to [facility] if you are doing the vials. So no order is needed for them to give it to her.

## 2019-04-16 NOTE — TELEPHONE ENCOUNTER
----- Message from Elizabeth Flores sent at 4/16/2019  7:50 AM CDT -----  Contact: Cynthia/[facility]had  Type:  Patient Returning Call    Who Called:Cynthia  Who Left Message for Patient:Jhoana  Does the patient know what this is regarding?appt  Would the patient rather a call back or a response via [facility]? Call back  Best Call Back Number:[phone]  Additional Information:na

## 2019-04-16 NOTE — TELEPHONE ENCOUNTER
Pt's daughter states that she spoke with [facility], and they stated that they will be able to give the pt her insulin injection. [facility] states that they just need a order from you stating that they can give the injection and they also need the prescription for the insulin printed instead of called in to a pharmacy, and faxed to them.

## 2019-04-16 NOTE — TELEPHONE ENCOUNTER
----- Message from Cisco Espinosa sent at 4/16/2019 11:24 AM CDT -----  Contact: Snehal(daughter) [phone]  Would like a nurse to contact her @ [phone] regarding meds. Medical management states orders can be sent to fax # [phone]@ [facility].

## 2019-04-17 NOTE — TELEPHONE ENCOUNTER
Called patient, spoke with her daughter Cynthia, and advised that Lori at [facility] didn't need the orders for them to give Mrs. Durbin her insulin, they just needed the prescription for the  needles and vials to be sent over to their pharmacy, which Dr. Dunlap has completed and sent over. Cynthia thanked us for getting it taken care of. /OSWALD

## 2019-04-23 ENCOUNTER — TELEPHONE (OUTPATIENT)
Dept: FAMILY MEDICINE | Facility: CLINIC | Age: 84
End: 2019-04-23

## 2019-04-23 NOTE — TELEPHONE ENCOUNTER
Called Snehal (pt's daughter), and advised that it will be against the patient's privacy rights to mail that information to her son. I advised that the patient will receive that information after the appointment, but no one will be able to print it and send it out. She advised that Cynthia is in and out of town, but pt's son is a doctor and will be able to explain the information better to them regarding the findings of their mother's condition(s). I verbalized understanding, and provided the general [facility] phone number to try contacting the nephrology department to request information after the appointment./OSWALD

## 2019-04-23 NOTE — TELEPHONE ENCOUNTER
----- Message from Belinda Gallegos sent at 4/23/2019  4:14 PM CDT -----  Contact: Snehal (pts daughter )  Caller request a copy of recommendations  from nephro appt  mailed to pts son she stated he's a doctor and will understand outcome more  To go over with family ([place] address : [address] [place] [address] pts son )  Call back: [phone] (Snehal pts daughter  )

## 2019-04-24 NOTE — TELEPHONE ENCOUNTER
----- Message from Tiffany Garcia sent at 4/24/2019 12:04 PM CDT -----  Type:  RX Refill Request    Who Called:  Haylee                                                                                                                    Refill or New Rx: refill  RX Name and Strength: Januvia 50mg  How is the patient currently taking it? (ex. 1XDay): takes once daily with evening meal  Is this a 30 day or 90 day RX: 30 day  Preferred Pharmacy with phone number: [facility] at [phone]  And fax is [phone]  Local or Mail Order: Local  Ordering Provider: Dr Dunlap  Would the patient rather a call back or a response via [facility]?  Call back  Best Call Back Number:   Additional Information: please send [email or web address]

## 2019-04-24 NOTE — TELEPHONE ENCOUNTER
Haylee from home health is requesting a refill on pt's Januvia and is needing it to go to [facility] instead of [facility].  Lp: 4/9/19

## 2019-04-29 ENCOUNTER — TELEPHONE (OUTPATIENT)
Dept: NEPHROLOGY | Facility: CLINIC | Age: 84
End: 2019-04-29

## 2019-04-29 ENCOUNTER — LAB VISIT (OUTPATIENT)
Dept: LAB | Facility: HOSPITAL | Age: 84
End: 2019-04-29
Attending: INTERNAL MEDICINE
Payer: MEDICARE

## 2019-04-29 ENCOUNTER — OFFICE VISIT (OUTPATIENT)
Dept: NEPHROLOGY | Facility: CLINIC | Age: 84
End: 2019-04-29
Payer: MEDICARE

## 2019-04-29 VITALS
WEIGHT: 140 LBS | HEIGHT: 64 IN | BODY MASS INDEX: 23.9 KG/M2 | SYSTOLIC BLOOD PRESSURE: 120 MMHG | HEART RATE: 70 BPM | DIASTOLIC BLOOD PRESSURE: 54 MMHG

## 2019-04-29 DIAGNOSIS — N18.30 CHRONIC KIDNEY DISEASE, STAGE III (MODERATE): Primary | ICD-10-CM

## 2019-04-29 DIAGNOSIS — N14.0 ANALGESIC NEPHROPATHY: ICD-10-CM

## 2019-04-29 DIAGNOSIS — N18.4 CKD (CHRONIC KIDNEY DISEASE) STAGE 4, GFR 15-29 ML/MIN: Primary | ICD-10-CM

## 2019-04-29 DIAGNOSIS — I95.2 HYPOTENSION DUE TO DRUGS: ICD-10-CM

## 2019-04-29 DIAGNOSIS — I15.2 HYPERTENSION ASSOCIATED WITH DIABETES: Chronic | ICD-10-CM

## 2019-04-29 DIAGNOSIS — E11.59 HYPERTENSION ASSOCIATED WITH DIABETES: Chronic | ICD-10-CM

## 2019-04-29 DIAGNOSIS — Z71.89 ENCOUNTER FOR MEDICATION REVIEW AND COUNSELING: ICD-10-CM

## 2019-04-29 DIAGNOSIS — N18.30 CHRONIC KIDNEY DISEASE, STAGE III (MODERATE): ICD-10-CM

## 2019-04-29 PROCEDURE — 1101F PR PT FALLS ASSESS DOC 0-1 FALLS W/OUT INJ PAST YR: ICD-10-PCS | Mod: HCNC,CPTII,S$GLB, | Performed by: INTERNAL MEDICINE

## 2019-04-29 PROCEDURE — 99205 PR OFFICE/OUTPT VISIT, NEW, LEVL V, 60-74 MIN: ICD-10-PCS | Mod: HCNC,S$GLB,, | Performed by: INTERNAL MEDICINE

## 2019-04-29 PROCEDURE — 99205 OFFICE O/P NEW HI 60 MIN: CPT | Mod: HCNC,S$GLB,, | Performed by: INTERNAL MEDICINE

## 2019-04-29 PROCEDURE — 1101F PT FALLS ASSESS-DOCD LE1/YR: CPT | Mod: HCNC,CPTII,S$GLB, | Performed by: INTERNAL MEDICINE

## 2019-04-29 PROCEDURE — 99999 PR PBB SHADOW E&M-EST. PATIENT-LVL III: ICD-10-PCS | Mod: PBBFAC,HCNC,, | Performed by: INTERNAL MEDICINE

## 2019-04-29 PROCEDURE — 99999 PR PBB SHADOW E&M-EST. PATIENT-LVL III: CPT | Mod: PBBFAC,HCNC,, | Performed by: INTERNAL MEDICINE

## 2019-04-29 RX ORDER — FUROSEMIDE 20 MG/1
TABLET ORAL
Qty: 90 TABLET | Refills: 1
Start: 2019-04-29 | End: 2019-10-11

## 2019-04-29 NOTE — LETTER
April 29, 2019      Nathalia Dunlap MD  [address]  [place] [address]           [facility]  [address]  [place] [address]  Phone: [phone]  Fax: [phone]          Patient: Gwen Durbin   MR Number: [id number]   YOB: 1928   Date of Visit: 4/29/2019       Dear Dr. Nathalia Dunlap:    Thank you for referring Gwen Durbin to me for evaluation. Attached you will find relevant portions of my assessment and plan of care.    If you have questions, please do not hesitate to call me. I look forward to following Gwen Durbin along with you.    Sincerely,    Deedee Ramirez MD    Enclosure  CC:  No Recipients    If you would like to receive this communication electronically, please contact [email or web address] or [phone] to request more information on [employer] Link access.    For providers and/or their staff who would like to refer a patient to [facility], please contact us through our one-stop-shop provider referral line, [facility], at [phone].    If you feel you have received this communication in error or would no longer like to receive these types of communications, please e-mail [email or web address]

## 2019-04-29 NOTE — TELEPHONE ENCOUNTER
----- Message from Shira Dc sent at 4/29/2019 10:10 AM CDT -----  Contact: pt daughter Snehal [phone]  Pt daughter Snehal is asking the staff to call daughter with any finding test results on patient . Call back [phone]    Thanks

## 2019-04-29 NOTE — PROGRESS NOTES
Gwen Durbin is a 89 y.o. female for whom nephrology consult has been requested to evaluate and give opinion.   Referring physician: Dr. Dunlap  Reason fro consult: acute kidney injury    HPI: Thank you for referring the pt to us. Pt was seen and examined, and records were reviewed. Pt is a 89 y/o female who lives at [facility], who was found to have progressively worsened renal function. Pt has a s Cr of 0.9 at baseline, s Cr was 1.3 five months ago, 1.7, three months ago, and 1.9 two weeks ago. Pt presents to the clinic with her sitter Mally. Pt is currently feeling well, no acute issues, no discomfort. Pt reports taking aleve twice per day, almost everyday for chronic leg pain. Noted This medication was stopped by Dr. Dunlap about 3 weeks ago. No other pertinent findings, no GI loses, no diarrhea, no dehydration, no change in PO intake, no symptoms of UTI or dysuria, no recent infections, no abx. I spoke with pt's daughter on the phone.    PAST MEDICAL HISTORY:  Aortic insufficiency, CKD (chronic kidney disease) stage 3, GFR 30-59 ml/min, Closed lumbar vertebral fracture, Colon polyps, Diabetes mellitus type 2, uncontrolled, Gait instability, fracture of pelvis, Hyperlipidemia, Hypertension, Hypothyroidism, Lower extremity edema, Macular degeneration, Osteoarthritis of multiple joints, Osteoporosis, senile, Pituitary adenoma, Tobacco use disorder, and Urinary incontinence.    PAST SURGICAL HISTORY:  She  has a past surgical history that includes Cataract extraction w/ intraocular lens  implant, bilateral; Pituitary adenectomy; Cholecystectomy; Appendectomy; TONSILLECTOMY, ADENOIDECTOMY; and Hysterectomy.    SOCIAL HISTORY:  She  reports that she quit smoking about 17 months ago. Her smoking use included cigarettes. She smoked 0.50 packs per day. She has never used smokeless tobacco. She reports that she does not drink alcohol or use drugs.    FAMILY MEDICAL HISTORY:  Her family history  "includes Alzheimer's disease in her father; Cancer in her sister; Heart disease in her maternal grandmother and mother.    Review of patient's allergies indicates:  No Known Allergies        Prior to Admission medications    Medication Sig Start Date End Date Taking? Authorizing Provider   b complex vitamins tablet Take 1 tablet by mouth once daily.   Yes Historical Provider, MD   buPROPion (WELLBUTRIN SR) 150 MG TBSR 12 hr tablet Take 1 tablet twice daily. 5/18/18  Yes Nathalia Dunlap MD   docusate sodium (COLACE) 100 MG capsule Take 100 mg by mouth 2 (two) times daily.   Yes Historical Provider, MD   fish oil-omega-3 fatty acids 300-1,000 mg capsule Take 2 g by mouth.   Yes Historical Provider, MD   furosemide (LASIX) 20 MG tablet TAKE 1 TABLET(20 MG) PO qd 4/29/19  Yes Deedee Ramirez MD   glimepiride (AMARYL) 4 MG tablet Take 1 tablet (4 mg total) by mouth daily with breakfast. 11/20/18  Yes Nathalia Dunlap MD   insulin (LANTUS SOLOSTAR U-100 INSULIN) glargine 100 units/mL (3mL) SubQ pen Inject 10 Units into the skin every evening. 4/16/19 4/15/20 Yes Nathalia Dunlap MD   LANCETS & BLOOD GLUCOSE STRIPS MISC by Misc.(Non-Drug; Combo Route) route 2 (two) times daily.   Yes Historical Provider, MD   lancets (FREESTYLE LANCETS) 28 gauge Misc 1 Units by Misc.(Non-Drug; Combo Route) route as directed. Check blood sugar 3 to 4 times daily. 8/6/13  Yes Juan Lennon NP   loperamide (IMODIUM) 2 mg capsule Take 2 mg by mouth 4 (four) times daily as needed for Diarrhea.   Yes Historical Provider, MD   pen needle, diabetic 32 gauge x 3/16" Ndle 1 Device by Misc.(Non-Drug; Combo Route) route every evening. 4/16/19  Yes Nathalia Dunlap MD   potassium chloride (MICRO-K) 10 MEQ CpSR Take 1 capsule (10 mEq total) by mouth once daily. 11/14/18  Yes Nathalia Dunlap MD   simvastatin (ZOCOR) 40 MG tablet Take 1 tablet (40 mg total) by mouth every evening. 5/21/18  Yes Nathalia Dunlap MD   SITagliptin (JANUVIA) 50 " "MG Tab Take 1 tablet (50 mg total) by mouth once daily. 4/24/19  Yes Nathalia Dunlap MD   vit C/vit E/lutein/min/omega-3 (OCUVITE ORAL) Take by mouth.   Yes Historical Provider, MD   furosemide (LASIX) 20 MG tablet TAKE 1 TABLET(20 MG) BY MOUTH TWICE DAILY 1/16/19 4/29/19 Yes Nathalia Dunlap MD   lisinopril 10 MG tablet Take 1 tablet (10 mg total) by mouth once daily. 5/14/18 4/29/19 Yes Nathalia Dunlap MD   blood sugar diagnostic, disc Strp 1 strip by Misc.(Non-Drug; Combo Route) route 2 (two) times daily. Please send Accu Chek Guide 5/11/17   Nathalia Dunlap MD        REVIEW OF SYSTEMS:  Patient has no fever, fatigue, visual changes, chest pain, edema, cough, dyspnea, nausea, vomiting, constipation, diarrhea, arthralgias, pruritis, dizziness, weakness, depression, confusion.    PHYSICAL EXAM:   height is 5' 4" (1.626 m) and weight is 63.5 kg (140 lb). Her blood pressure is 120/54 (abnormal) and her pulse is 70.   Gen: WDWN female in no apparent distress  Psych: Normal mood and affect  Skin: No rashes or ulcers  Eyes: Normal conjunctiva and lids, PERRLA  ENT: Normal hearing with no oropharyngeal lesions  Neck: No JVD  Chest: Clear with no rales, rhonchi, wheezing with normal effort  CV: Regular with no murmurs, gallops or rubs  Abd: Soft, nontender, no distension, positive bowel sounds  Ext: No cyanosis, clubbing or edema    Labs reviewed  BMP  Lab Results   Component Value Date     04/11/2019    K 5.0 04/11/2019     04/11/2019    CO2 22 (L) 04/11/2019    BUN 43 (H) 04/11/2019    CREATININE 1.9 (H) 04/11/2019    CALCIUM 10.0 04/11/2019    ANIONGAP 12 04/11/2019    ESTGFRAFRICA 26.4 (A) 04/11/2019    EGFRNONAA 22.9 (A) 04/11/2019     Lab Results   Component Value Date    WBC 6.92 04/11/2019    HGB 12.9 04/11/2019    HCT 40.0 04/11/2019     (H) 04/11/2019     04/11/2019       IMPRESSION AND RECOMMENDATIONS:  89 y/o female with INDIGO.  The impressio is:    1. Renal INDIGO. Highly " suspect due to aleve (ibuprofen)  Analgesic nephropathy  Aleve was stopped 3 weeks ago  Labs need to be repeated today  K was normal  Mild metabolic acidosis  Also contributing to INDIGO is pt's relatively low BP/hypotension, given her advanced age  NSAIDs can easily decrease renal blood flow in elderly people, specially if hypotension or dehydration is also present.  Expect renal failure will stabilize and improved post d/c NSAIDs    2. HTN: BP controlled to low  Goal for SBP in this elderly pt is higher, 130-150.  Lisinopril was stopped  No significant fluid gain: will decrease lasix 20 mg form bid to qd.    3. Cardiac: h/o of CHF.  Well-compensated.  On lasix  Advised pt of low salt and moderate fluid intake in diet.    Plans and recommendations:  As discussed above  D/c lisinopril  Decrease lasix to 20 mg po qd  Take tylenol as needed instead of ibuprofen  Advised the Assisted Living staff memeber  Called pt's daughter in [place], [place], and informed her of above  Repeat labs today, including urine Na and Cr to calculate FENa  RTC 1 week.  Total time spent 60 minutes including time needed to review the records, the   patient evaluation, documentation, face-to-face discussion with the patient,   more than 50% of the time was spent on coordination of care and counseling.    Level V visit.    Deedee Ramirez MD

## 2019-05-09 ENCOUNTER — OFFICE VISIT (OUTPATIENT)
Dept: NEPHROLOGY | Facility: CLINIC | Age: 84
End: 2019-05-09
Payer: MEDICARE

## 2019-05-09 ENCOUNTER — LAB VISIT (OUTPATIENT)
Dept: LAB | Facility: HOSPITAL | Age: 84
End: 2019-05-09
Attending: INTERNAL MEDICINE
Payer: MEDICARE

## 2019-05-09 VITALS
HEIGHT: 64 IN | WEIGHT: 140 LBS | DIASTOLIC BLOOD PRESSURE: 60 MMHG | HEART RATE: 78 BPM | SYSTOLIC BLOOD PRESSURE: 138 MMHG | BODY MASS INDEX: 23.9 KG/M2

## 2019-05-09 DIAGNOSIS — Z71.89 ENCOUNTER FOR MEDICATION REVIEW AND COUNSELING: ICD-10-CM

## 2019-05-09 DIAGNOSIS — N14.0 ANALGESIC NEPHROPATHY: ICD-10-CM

## 2019-05-09 DIAGNOSIS — E87.5 HYPERKALEMIA: ICD-10-CM

## 2019-05-09 DIAGNOSIS — E87.20 METABOLIC ACIDOSIS: ICD-10-CM

## 2019-05-09 DIAGNOSIS — N17.9 ACUTE KIDNEY INJURY: Primary | ICD-10-CM

## 2019-05-09 DIAGNOSIS — I95.2 HYPOTENSION DUE TO DRUGS: ICD-10-CM

## 2019-05-09 DIAGNOSIS — N18.4 CKD (CHRONIC KIDNEY DISEASE) STAGE 4, GFR 15-29 ML/MIN: ICD-10-CM

## 2019-05-09 DIAGNOSIS — N18.30 CHRONIC KIDNEY DISEASE, STAGE III (MODERATE): ICD-10-CM

## 2019-05-09 LAB
ALBUMIN SERPL BCP-MCNC: 3.3 G/DL (ref 3.5–5.2)
ANION GAP SERPL CALC-SCNC: 10 MMOL/L (ref 8–16)
BUN SERPL-MCNC: 24 MG/DL (ref 8–23)
CALCIUM SERPL-MCNC: 10 MG/DL (ref 8.7–10.5)
CHLORIDE SERPL-SCNC: 105 MMOL/L (ref 95–110)
CO2 SERPL-SCNC: 23 MMOL/L (ref 23–29)
CREAT SERPL-MCNC: 1.3 MG/DL (ref 0.5–1.4)
EST. GFR  (AFRICAN AMERICAN): 42 ML/MIN/1.73 M^2
EST. GFR  (NON AFRICAN AMERICAN): 36 ML/MIN/1.73 M^2
GLUCOSE SERPL-MCNC: 157 MG/DL (ref 70–110)
PHOSPHATE SERPL-MCNC: 3.9 MG/DL (ref 2.7–4.5)
POTASSIUM SERPL-SCNC: 4.8 MMOL/L (ref 3.5–5.1)
SODIUM SERPL-SCNC: 138 MMOL/L (ref 136–145)

## 2019-05-09 PROCEDURE — 1101F PT FALLS ASSESS-DOCD LE1/YR: CPT | Mod: HCNC,CPTII,S$GLB, | Performed by: INTERNAL MEDICINE

## 2019-05-09 PROCEDURE — 99215 OFFICE O/P EST HI 40 MIN: CPT | Mod: HCNC,S$GLB,, | Performed by: INTERNAL MEDICINE

## 2019-05-09 PROCEDURE — 99499 UNLISTED E&M SERVICE: CPT | Mod: HCNC,S$GLB,, | Performed by: INTERNAL MEDICINE

## 2019-05-09 PROCEDURE — 1101F PR PT FALLS ASSESS DOC 0-1 FALLS W/OUT INJ PAST YR: ICD-10-PCS | Mod: HCNC,CPTII,S$GLB, | Performed by: INTERNAL MEDICINE

## 2019-05-09 PROCEDURE — 99215 PR OFFICE/OUTPT VISIT, EST, LEVL V, 40-54 MIN: ICD-10-PCS | Mod: HCNC,S$GLB,, | Performed by: INTERNAL MEDICINE

## 2019-05-09 PROCEDURE — 99999 PR PBB SHADOW E&M-EST. PATIENT-LVL III: CPT | Mod: PBBFAC,HCNC,, | Performed by: INTERNAL MEDICINE

## 2019-05-09 PROCEDURE — 99999 PR PBB SHADOW E&M-EST. PATIENT-LVL III: ICD-10-PCS | Mod: PBBFAC,HCNC,, | Performed by: INTERNAL MEDICINE

## 2019-05-09 PROCEDURE — 80069 RENAL FUNCTION PANEL: CPT | Mod: HCNC

## 2019-05-09 PROCEDURE — 36415 COLL VENOUS BLD VENIPUNCTURE: CPT | Mod: HCNC

## 2019-05-09 PROCEDURE — 99499 RISK ADDL DX/OHS AUDIT: ICD-10-PCS | Mod: HCNC,S$GLB,, | Performed by: INTERNAL MEDICINE

## 2019-05-09 NOTE — PROGRESS NOTES
Renal clinic f/u note:  Date of clinic visit: 5/9/19    Referring physician: Dr. Dunlap  Reason for f/u and chief c/o: acute kidney injury     HPI: Pt is a 89 y/o female who was initially referred to us for INDIGO. Pt was taking a large number of aleve (bid) as was previously found. Pt lives at [facility], and was taking aleve for chronic leg pain. No other causes of INDIGO were found. Pt has a s Cr of 0.9 at baseline, s Cr was 1.3 five months ago, 1.7, three months ago, and 1.9 two weeks ago. Aleve was stopped. Pt was also on lisinopril, that was stopped as well. Also, lasix was reduced last visit from bid to qd, as BP was somewhat low. Labs were repeated. Pt is currently feeling well, no acute issues, no discomfort. No other pertinent findings, no GI loses, no diarrhea, no dehydration, no change in PO intake, no symptoms of UTI or dysuria, no recent infections, no abx. Tylenol is now being used prn instead.     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     PAST SURGICAL HISTORY:  She  has a past surgical history that includes Cataract extraction w/ intraocular lens  implant, bilateral; Pituitary adenectomy; Cholecystectomy; Appendectomy; TONSILLECTOMY, ADENOIDECTOMY; and Hysterectomy.     SOCIAL HISTORY:  She  reports that she quit smoking about 17 months ago. Her smoking use included cigarettes. She smoked 0.50 packs per day. She has never used smokeless tobacco. She reports that she does not drink alcohol or use drugs.     FAMILY MEDICAL HISTORY:  Her family history includes Alzheimer's disease in her  "father; Cancer in her sister; Heart disease in her maternal grandmother and mother.     Review of patient's allergies indicates:  No Known Allergies     Meds reviewed    Current Outpatient Medications:     b complex vitamins tablet, Take 1 tablet by mouth once daily., Disp: , Rfl:     blood sugar diagnostic, disc Strp, 1 strip by Misc.(Non-Drug; Combo Route) route 2 (two) times daily. Please send Accu Chek Guide, Disp: 100 strip, Rfl: 5    buPROPion (WELLBUTRIN SR) 150 MG TBSR 12 hr tablet, Take 1 tablet twice daily., Disp: 180 tablet, Rfl: 3    docusate sodium (COLACE) 100 MG capsule, Take 100 mg by mouth 2 (two) times daily., Disp: , Rfl:     fish oil-omega-3 fatty acids 300-1,000 mg capsule, Take 2 g by mouth., Disp: , Rfl:     furosemide (LASIX) 20 MG tablet, TAKE 1 TABLET(20 MG) PO qd, Disp: 90 tablet, Rfl: 1    glimepiride (AMARYL) 4 MG tablet, Take 1 tablet (4 mg total) by mouth daily with breakfast., Disp: 30 tablet, Rfl: 5    insulin (LANTUS SOLOSTAR U-100 INSULIN) glargine 100 units/mL (3mL) SubQ pen, Inject 10 Units into the skin every evening., Disp: 1 Box, Rfl: 5    LANCETS & BLOOD GLUCOSE STRIPS MISC, by Misc.(Non-Drug; Combo Route) route 2 (two) times daily., Disp: , Rfl:     lancets (FREESTYLE LANCETS) 28 gauge Misc, 1 Units by Misc.(Non-Drug; Combo Route) route as directed. Check blood sugar 3 to 4 times daily., Disp: 100 each, Rfl: 6    loperamide (IMODIUM) 2 mg capsule, Take 2 mg by mouth 4 (four) times daily as needed for Diarrhea., Disp: , Rfl:     pen needle, diabetic 32 gauge x 3/16" Ndle, 1 Device by Misc.(Non-Drug; Combo Route) route every evening., Disp: 50 each, Rfl: 5    potassium chloride (MICRO-K) 10 MEQ CpSR, Take 1 capsule (10 mEq total) by mouth once daily., Disp: 30 capsule, Rfl: 5    simvastatin (ZOCOR) 40 MG tablet, Take 1 tablet (40 mg total) by mouth every evening., Disp: 30 tablet, Rfl: 11    SITagliptin (JANUVIA) 50 MG Tab, Take 1 tablet (50 mg total) by mouth " "once daily., Disp: 30 tablet, Rfl: 11    vit C/vit E/lutein/min/omega-3 (OCUVITE ORAL), Take by mouth., Disp: , Rfl:         REVIEW OF SYSTEMS:  Patient has no fever, fatigue, visual changes, chest pain, edema, cough, dyspnea, nausea, vomiting, constipation, diarrhea, arthralgias, pruritis, dizziness, weakness, depression, confusion.     PHYSICAL EXAM:  Blood pressure 138/60, pulse 78, height 5' 4" (1.626 m), weight 63.5 kg (139 lb 15.9 oz).  Gen:    WDWN female in no apparent distress  Psych: Normal mood and affect  Skin:    No rashes or ulcers  Eyes:   Normal conjunctiva and lids, PERRLA  ENT:    Normal hearing with no oropharyngeal lesions  Neck:   No JVD  Chest:  Clear with no rales, rhonchi, wheezing with normal effort  CV:      Regular with no murmurs, gallops or rubs  Abd:     Soft, nontender, no distension, positive bowel sounds  Ext:      No cyanosis, clubbing or edema     Labs reviewed  BMP  Lab Results   Component Value Date     05/09/2019    K 4.8 05/09/2019     05/09/2019    CO2 23 05/09/2019    BUN 24 (H) 05/09/2019    CREATININE 1.3 05/09/2019    CALCIUM 10.0 05/09/2019    ANIONGAP 10 05/09/2019    ESTGFRAFRICA 42 (A) 05/09/2019    EGFRNONAA 36 (A) 05/09/2019     Lab Results   Component Value Date    WBC 6.92 04/11/2019    HGB 12.9 04/11/2019    HCT 40.0 04/11/2019     (H) 04/11/2019     04/11/2019     Urine Na 86, U osm 58,  FENa 2.1%  U/a: no protein, no blood, no RBC's, no casts      IMPRESSION AND RECOMMENDATIONS:  89 y/o female with INDIGO.  The impression is:     1. Renal: s Cr has improved. INDIGO resolving  INDIGO highly suspect due to aleve (ibuprofen). Analgesic nephropathy  In addition, pt is an elderly and taking an ACE-I at the same time as taking aleve also contributed to decreased renal blood flow  Also, hypotension, may have played a role in INDIGO  However, FENa calculation does not suggest prerenal azotemia  Aleve and lisinopril were both stopped  s Cr now closer to " prior baseline  Hyperkalemia on presentation, has improved, K normal now  Metabolic acidosis, has improved    NSAIDs can easily decrease renal blood flow in elderly people, specially if hypotension or dehydration is also present.     2. HTN: BP was controlled to low on initial presentation BP well controlled now  Lisinopril was stopped last visit and lasix dose was reduced from bid to qd.  Goal for SBP in this elderly pt is 130-150.  Suggest do not treat BP aggressively, risk of injury and fall  Meds reviewed  No med changes today  Lisinopril was not re-started    3. Cardiac: h/o of CHF.  Well-compensated on reduced qd dose of lasix  No sx's or signs of fluid overload  Advised pt of low salt and moderate fluid intake in diet.     Plans and recommendations:  As discussed above  Take tylenol as needed instead of ibuprofen  Advised the Assisted Living staff member  RTC 3-4 months  Total time spent 40minutes including time needed to review the records, the   patient evaluation, documentation, face-to-face discussion with the patient,   more than 50% of the time was spent on coordination of care and counseling.    Level V visit.     Deedee Ramirez MD

## 2019-05-10 NOTE — TELEPHONE ENCOUNTER
Received a fax from [facility] for a refill authorization request for  Patient's glimepiride 4mg tab. I called the pharmacy [phone] to request information to have prescriptions e-scribed but pharmacist said that they do not receive those requests, and preferred that we fax it at [phone]./[email or web address]

## 2019-06-13 ENCOUNTER — TELEPHONE (OUTPATIENT)
Dept: FAMILY MEDICINE | Facility: CLINIC | Age: 84
End: 2019-06-13

## 2019-06-13 NOTE — TELEPHONE ENCOUNTER
----- Message from Elizabeth Flores sent at 6/13/2019 10:51 AM CDT -----  Contact: Lori/[facility]  Type:  Needs Medical Advice    Who Called: Lori#  Symptoms (please be specific): Nasal/sinus Congestion/sore throat   How long has patient had these symptoms:  Last night  Pharmacy name and phone #:  [facility]  Would the patient rather a call back or a response via [facility]? Call back  Best Call Back Number: [phone]  Additional Information: na

## 2019-06-21 ENCOUNTER — TELEPHONE (OUTPATIENT)
Dept: FAMILY MEDICINE | Facility: CLINIC | Age: 84
End: 2019-06-21

## 2019-06-21 ENCOUNTER — OFFICE VISIT (OUTPATIENT)
Dept: FAMILY MEDICINE | Facility: CLINIC | Age: 84
End: 2019-06-21
Payer: MEDICARE

## 2019-06-21 VITALS
HEART RATE: 69 BPM | SYSTOLIC BLOOD PRESSURE: 130 MMHG | OXYGEN SATURATION: 95 % | DIASTOLIC BLOOD PRESSURE: 70 MMHG | BODY MASS INDEX: 25.21 KG/M2 | WEIGHT: 147.69 LBS | HEIGHT: 64 IN | TEMPERATURE: 98 F

## 2019-06-21 DIAGNOSIS — J30.9 ALLERGIC RHINITIS, UNSPECIFIED SEASONALITY, UNSPECIFIED TRIGGER: Primary | ICD-10-CM

## 2019-06-21 PROCEDURE — 99999 PR PBB SHADOW E&M-EST. PATIENT-LVL IV: CPT | Mod: PBBFAC,HCNC,, | Performed by: REGISTERED NURSE

## 2019-06-21 PROCEDURE — 1101F PT FALLS ASSESS-DOCD LE1/YR: CPT | Mod: HCNC,CPTII,S$GLB, | Performed by: REGISTERED NURSE

## 2019-06-21 PROCEDURE — 99213 PR OFFICE/OUTPT VISIT, EST, LEVL III, 20-29 MIN: ICD-10-PCS | Mod: HCNC,S$GLB,, | Performed by: REGISTERED NURSE

## 2019-06-21 PROCEDURE — 1101F PR PT FALLS ASSESS DOC 0-1 FALLS W/OUT INJ PAST YR: ICD-10-PCS | Mod: HCNC,CPTII,S$GLB, | Performed by: REGISTERED NURSE

## 2019-06-21 PROCEDURE — 99999 PR PBB SHADOW E&M-EST. PATIENT-LVL IV: ICD-10-PCS | Mod: PBBFAC,HCNC,, | Performed by: REGISTERED NURSE

## 2019-06-21 PROCEDURE — 99213 OFFICE O/P EST LOW 20 MIN: CPT | Mod: HCNC,S$GLB,, | Performed by: REGISTERED NURSE

## 2019-06-21 RX ORDER — LORATADINE 10 MG/1
10 TABLET ORAL DAILY
Qty: 30 TABLET | Refills: 2 | Status: SHIPPED | OUTPATIENT
Start: 2019-06-21 | End: 2022-01-10

## 2019-06-21 RX ORDER — BENZONATATE 100 MG/1
100 CAPSULE ORAL 3 TIMES DAILY PRN
Qty: 30 CAPSULE | Refills: 0 | Status: SHIPPED | OUTPATIENT
Start: 2019-06-21 | End: 2020-02-17 | Stop reason: ALTCHOICE

## 2019-06-21 NOTE — TELEPHONE ENCOUNTER
----- Message from Hermilo Jamison sent at 6/21/2019  7:29 AM CDT -----  ..Type:  Needs Medical Advice    Who Called: pt   Symptoms (please be specific): cough  How long has patient had these symptoms:   Pharmacy name and phone #: ..  [place] [id number] [address]  Phone: [phone] Fax: [phone]            Would the patient rather a call back or a response via [facility]? Call back   Best Call Back Number: [phone]  Additional Information:

## 2019-06-21 NOTE — PROGRESS NOTES
Subjective:       Patient ID: Gwen Durbin is a 89 y.o. female.    Chief Complaint   Patient presents with    Nasal Congestion    Cough       HPI    Gwen Durbin is here today with Brisa (caregiver from [facility]) with c/o not feeling well for the past few days.  Is followed by ENT, currently using nasal spray.  Reports clear RN, sneezing with itchy watery eyes.  Reports dry cough worse at PM and sore throat.  Good appetite.  Not sleeping well due to cough.      Review of Systems   Constitutional: Positive for fatigue. Negative for chills and fever.   HENT: Positive for congestion, postnasal drip, rhinorrhea, sneezing and sore throat. Negative for ear pain, nosebleeds, sinus pressure, sinus pain and trouble swallowing.    Eyes: Positive for discharge and itching. Negative for photophobia, pain, redness and visual disturbance.   Respiratory: Positive for cough. Negative for shortness of breath, wheezing and stridor.    Cardiovascular: Negative.    Allergic/Immunologic: Positive for environmental allergies.   Neurological: Negative.        Review of patient's allergies indicates:  No Known Allergies    Patient Active Problem List   Diagnosis    Hypertension associated with diabetes    Exudative macular degeneration    Aortic valve insufficiency    Lower extremity edema    Gait instability    Age-related osteoporosis with current pathological fracture with routine healing    Tobacco use disorder    Uncontrolled type 2 diabetes mellitus with hyperglycemia    Hearing impaired    Hyperlipidemia associated with type 2 diabetes mellitus    Vitamin D deficiency    Chronic kidney disease (CKD), stage III (moderate)    Osteoarthritis of multiple joints       Current Outpatient Medications on File Prior to Visit   Medication Sig Dispense Refill    b complex vitamins tablet Take 1 tablet by mouth once daily.      blood sugar diagnostic, disc Strp 1 strip by Misc.(Non-Drug; Combo Route) route 2 (two)  "times daily. Please send Accu Chek Guide 100 strip 5    buPROPion (WELLBUTRIN SR) 150 MG TBSR 12 hr tablet Take 1 tablet twice daily. 180 tablet 3    docusate sodium (COLACE) 100 MG capsule Take 100 mg by mouth 2 (two) times daily.      fish oil-omega-3 fatty acids 300-1,000 mg capsule Take 2 g by mouth.      furosemide (LASIX) 20 MG tablet TAKE 1 TABLET(20 MG) PO qd 90 tablet 1    glimepiride (AMARYL) 4 MG tablet Take 1 tablet (4 mg total) by mouth daily with breakfast. 30 tablet 5    insulin (LANTUS SOLOSTAR U-100 INSULIN) glargine 100 units/mL (3mL) SubQ pen Inject 10 Units into the skin every evening. 1 Box 5    LANCETS & BLOOD GLUCOSE STRIPS MISC by Misc.(Non-Drug; Combo Route) route 2 (two) times daily.      lancets (FREESTYLE LANCETS) 28 gauge Misc 1 Units by Misc.(Non-Drug; Combo Route) route as directed. Check blood sugar 3 to 4 times daily. 100 each 6    loperamide (IMODIUM) 2 mg capsule Take 2 mg by mouth 4 (four) times daily as needed for Diarrhea.      pen needle, diabetic 32 gauge x 3/16" Ndle 1 Device by Misc.(Non-Drug; Combo Route) route every evening. 50 each 5    potassium chloride (MICRO-K) 10 MEQ CpSR Take 1 capsule (10 mEq total) by mouth once daily. 30 capsule 5    simvastatin (ZOCOR) 40 MG tablet Take 1 tablet (40 mg total) by mouth every evening. 30 tablet 11    SITagliptin (JANUVIA) 50 MG Tab Take 1 tablet (50 mg total) by mouth once daily. 30 tablet 11    vit C/vit E/lutein/min/omega-3 (OCUVITE ORAL) Take by mouth.       No current facility-administered medications on file prior to visit.        Past medical, surgical, family and social histories have been reviewed today.        Objective:     Vitals:    06/21/19 0912   BP: 130/70   Pulse: 69   Temp: 97.9 °F (36.6 °C)   TempSrc: Oral   SpO2: 95%   Weight: 67 kg (147 lb 11.3 oz)   Height: 5' 4" (1.626 m)   PainSc: 0-No pain         Physical Exam   Constitutional: She is oriented to person, place, and time. She appears " well-developed and well-nourished.   HENT:   Head: Normocephalic and atraumatic.   Right Ear: Tympanic membrane normal.   Left Ear: Tympanic membrane normal.   Nose: Mucosal edema and rhinorrhea (boggy//clear RN) present. No sinus tenderness. No epistaxis. Right sinus exhibits no maxillary sinus tenderness and no frontal sinus tenderness. Left sinus exhibits no maxillary sinus tenderness and no frontal sinus tenderness.   Mouth/Throat: Mucous membranes are normal. No oropharyngeal exudate, posterior oropharyngeal edema or posterior oropharyngeal erythema (increased clear PND noted).   Eyes: Pupils are equal, round, and reactive to light. Right eye exhibits discharge (both eyes w/ clear DC, no redness). Left eye exhibits discharge.   Cardiovascular: Normal rate and regular rhythm.   Pulmonary/Chest: Effort normal and breath sounds normal. No stridor. No respiratory distress. She has no wheezes. She has no rales. She exhibits no tenderness.   Lymphadenopathy:     She has no cervical adenopathy.   Neurological: She is alert and oriented to person, place, and time.   Vitals reviewed.        Diagnosis       1. Allergic rhinitis, unspecified seasonality, unspecified trigger          Assessment/ Plan     Allergic rhinitis, unspecified seasonality, unspecified trigger  -     loratadine (CLARITIN) 10 mg tablet; Take 1 tablet (10 mg total) by mouth once daily.  Dispense: 30 tablet; Refill: 2  -     benzonatate (TESSALON) 100 MG capsule; Take 1 capsule (100 mg total) by mouth 3 (three) times daily as needed.  Dispense: 30 capsule; Refill: 0      Continue nasal spray as ordered per ENT.  Rest and fluids.  Follow-up in clinic as needed.          PAVITHRA Benavides  [facility]

## 2019-06-24 ENCOUNTER — TELEPHONE (OUTPATIENT)
Dept: FAMILY MEDICINE | Facility: CLINIC | Age: 84
End: 2019-06-24

## 2019-06-24 NOTE — TELEPHONE ENCOUNTER
Asked pharmacist to fax over refill request form since they don't accept e-scripts. Fax number verified./VLW    ----- Message from Coco Dempsey sent at 6/24/2019 12:06 PM CDT -----  Contact: Carey sandoval  Type:  RX Refill Request    Who Called: Haylee  Refill or New Rx:refill  RX Name and Strength:Lantis  How is the patient currently taking it? (ex. 1XDay):  Is this a 30 day or 90 day RX:30 day   Preferred Pharmacy with phone number:  [facility] [place] - [address]  [address] [address]  Phone: [phone] Fax: [phone]  Local or Mail Order:local  Ordering Provider:Dr. Dunlap  Would the patient rather a call back or a response via [facility]? call  Best Call Back Number:[phone]-ax [phone]  Additional Information: n/a

## 2019-07-03 ENCOUNTER — TELEPHONE (OUTPATIENT)
Dept: FAMILY MEDICINE | Facility: CLINIC | Age: 84
End: 2019-07-03

## 2019-07-12 ENCOUNTER — TELEPHONE (OUTPATIENT)
Dept: FAMILY MEDICINE | Facility: CLINIC | Age: 84
End: 2019-07-12

## 2019-07-12 NOTE — TELEPHONE ENCOUNTER
Kylah from [facility] states that pt was seen for claritan 1   azlestin 2 sprays 2wice  flonase 1

## 2019-07-12 NOTE — TELEPHONE ENCOUNTER
----- Message from Elizabeth Flores sent at 7/12/2019 10:35 AM CDT -----  Contact: Kylah/nurse/[facility]  Please call nurse @ [phone] regarding pt medication/nose spray.

## 2019-07-19 ENCOUNTER — TELEPHONE (OUTPATIENT)
Dept: FAMILY MEDICINE | Facility: CLINIC | Age: 84
End: 2019-07-19

## 2019-07-19 NOTE — TELEPHONE ENCOUNTER
Spoke with Ms. Victoria and informed her that pt's order was faxed over yesterday and she stated that it was received.

## 2019-07-19 NOTE — TELEPHONE ENCOUNTER
----- Message from Shelli Miranda sent at 7/19/2019 10:11 AM CDT -----  Contact: Ms Victoria with [facility]  Stated she calling for orders for the pt to have physical therapy at [facility], she can be reached at [phone] Thanks

## 2019-09-23 ENCOUNTER — LAB VISIT (OUTPATIENT)
Dept: LAB | Facility: HOSPITAL | Age: 84
End: 2019-09-23
Attending: INTERNAL MEDICINE
Payer: MEDICARE

## 2019-09-23 ENCOUNTER — OFFICE VISIT (OUTPATIENT)
Dept: NEPHROLOGY | Facility: CLINIC | Age: 84
End: 2019-09-23
Payer: MEDICARE

## 2019-09-23 VITALS
RESPIRATION RATE: 20 BRPM | WEIGHT: 149.06 LBS | HEIGHT: 64 IN | DIASTOLIC BLOOD PRESSURE: 72 MMHG | SYSTOLIC BLOOD PRESSURE: 120 MMHG | BODY MASS INDEX: 25.45 KG/M2 | HEART RATE: 64 BPM

## 2019-09-23 DIAGNOSIS — N17.9 ACUTE KIDNEY INJURY: Primary | ICD-10-CM

## 2019-09-23 DIAGNOSIS — Z71.89 ENCOUNTER FOR MEDICATION REVIEW AND COUNSELING: ICD-10-CM

## 2019-09-23 DIAGNOSIS — I10 ESSENTIAL HYPERTENSION: ICD-10-CM

## 2019-09-23 DIAGNOSIS — N14.0 ANALGESIC NEPHROPATHY: ICD-10-CM

## 2019-09-23 DIAGNOSIS — N17.9 ACUTE KIDNEY INJURY: ICD-10-CM

## 2019-09-23 LAB
ALBUMIN SERPL BCP-MCNC: 3.9 G/DL (ref 3.5–5.2)
ANION GAP SERPL CALC-SCNC: 11 MMOL/L (ref 8–16)
BUN SERPL-MCNC: 25 MG/DL (ref 8–23)
CALCIUM SERPL-MCNC: 9.9 MG/DL (ref 8.7–10.5)
CHLORIDE SERPL-SCNC: 103 MMOL/L (ref 95–110)
CO2 SERPL-SCNC: 24 MMOL/L (ref 23–29)
CREAT SERPL-MCNC: 1.3 MG/DL (ref 0.5–1.4)
EST. GFR  (AFRICAN AMERICAN): 41.7 ML/MIN/1.73 M^2
EST. GFR  (NON AFRICAN AMERICAN): 36.2 ML/MIN/1.73 M^2
GLUCOSE SERPL-MCNC: 181 MG/DL (ref 70–110)
PHOSPHATE SERPL-MCNC: 3.6 MG/DL (ref 2.7–4.5)
POTASSIUM SERPL-SCNC: 4.5 MMOL/L (ref 3.5–5.1)
SODIUM SERPL-SCNC: 138 MMOL/L (ref 136–145)

## 2019-09-23 PROCEDURE — 99214 PR OFFICE/OUTPT VISIT, EST, LEVL IV, 30-39 MIN: ICD-10-PCS | Mod: HCNC,S$GLB,, | Performed by: INTERNAL MEDICINE

## 2019-09-23 PROCEDURE — 99999 PR PBB SHADOW E&M-EST. PATIENT-LVL IV: ICD-10-PCS | Mod: PBBFAC,HCNC,, | Performed by: INTERNAL MEDICINE

## 2019-09-23 PROCEDURE — 80069 RENAL FUNCTION PANEL: CPT | Mod: HCNC

## 2019-09-23 PROCEDURE — 99999 PR PBB SHADOW E&M-EST. PATIENT-LVL IV: CPT | Mod: PBBFAC,HCNC,, | Performed by: INTERNAL MEDICINE

## 2019-09-23 PROCEDURE — 36415 COLL VENOUS BLD VENIPUNCTURE: CPT | Mod: HCNC

## 2019-09-23 PROCEDURE — 99499 UNLISTED E&M SERVICE: CPT | Mod: HCNC,S$GLB,, | Performed by: INTERNAL MEDICINE

## 2019-09-23 PROCEDURE — 99499 RISK ADDL DX/OHS AUDIT: ICD-10-PCS | Mod: HCNC,S$GLB,, | Performed by: INTERNAL MEDICINE

## 2019-09-23 PROCEDURE — 1101F PT FALLS ASSESS-DOCD LE1/YR: CPT | Mod: HCNC,CPTII,S$GLB, | Performed by: INTERNAL MEDICINE

## 2019-09-23 PROCEDURE — 1101F PR PT FALLS ASSESS DOC 0-1 FALLS W/OUT INJ PAST YR: ICD-10-PCS | Mod: HCNC,CPTII,S$GLB, | Performed by: INTERNAL MEDICINE

## 2019-09-23 PROCEDURE — 99214 OFFICE O/P EST MOD 30 MIN: CPT | Mod: HCNC,S$GLB,, | Performed by: INTERNAL MEDICINE

## 2019-09-23 RX ORDER — FLUTICASONE PROPIONATE 50 MCG
1 SPRAY, SUSPENSION (ML) NASAL DAILY
COMMUNITY

## 2019-09-23 RX ORDER — POLYETHYLENE GLYCOL 3350 17 G/17G
POWDER, FOR SOLUTION ORAL
COMMUNITY
End: 2020-02-17

## 2019-09-23 RX ORDER — AZELASTINE HCL 205.5 UG/1
SPRAY NASAL
COMMUNITY
End: 2019-10-11 | Stop reason: SDUPTHER

## 2019-09-23 RX ORDER — NAPROXEN 250 MG/1
500 TABLET ORAL 2 TIMES DAILY PRN
COMMUNITY
End: 2019-10-11 | Stop reason: SDUPTHER

## 2019-09-23 NOTE — PROGRESS NOTES
"Renal clinic f/u note:  Date of clinic visit: 9/23/19     Referring physician: Dr. 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 Pt lives at [facility], and was taking aleve for chronic leg pain. No other causes of INDIGO were found. Pt has a s Cr of 0.9 at baseline, s Cr had worsened to 1.9. Pt was last seen by us 4 months ago. After stopping aleve, s Cr already improved to 1.3. Pt was also taking lisinopril, which also was stopped. In addition, lasix was reduced from bid to qd, as BP was somewhat low.    On f/u visit today, pt is feeling "OK", no new c/o's, no SOB, no wosrening in mild leg swelling, no discomfort, no new events. Labs and meds reviewed. Lab results are pending from today. Tylenol is now being used prn instead of aleve.       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     PAST SURGICAL HISTORY:  She  has a past surgical history that includes Cataract extraction w/ intraocular lens  implant, bilateral; Pituitary adenectomy; Cholecystectomy; Appendectomy; TONSILLECTOMY, ADENOIDECTOMY; and Hysterectomy.     SOCIAL HISTORY:  She  reports that she quit smoking about 17 months ago. Her smoking use included cigarettes. She smoked 0.50 packs per day. She has never used smokeless tobacco. She reports that she does not drink alcohol or use drugs.     FAMILY MEDICAL HISTORY:  Her family history includes Alzheimer's disease in her father; " "Cancer in her sister; Heart disease in her maternal grandmother and mother.     Review of patient's allergies indicates:  No Known Allergies     Meds reviewed    Current Outpatient Medications:     azelastine 0.15 % (205.5 mcg) Spry, by Nasal route., Disp: , Rfl:     b complex vitamins tablet, Take 1 tablet by mouth once daily., Disp: , Rfl:     benzonatate (TESSALON) 100 MG capsule, Take 1 capsule (100 mg total) by mouth 3 (three) times daily as needed., Disp: 30 capsule, Rfl: 0    blood sugar diagnostic, disc Strp, 1 strip by Misc.(Non-Drug; Combo Route) route 2 (two) times daily. Please send Accu Chek Guide, Disp: 100 strip, Rfl: 5    buPROPion (WELLBUTRIN SR) 150 MG TBSR 12 hr tablet, Take 1 tablet twice daily., Disp: 180 tablet, Rfl: 3    docusate sodium (COLACE) 100 MG capsule, Take 100 mg by mouth 2 (two) times daily., Disp: , Rfl:     fish oil-omega-3 fatty acids 300-1,000 mg capsule, Take 2 g by mouth., Disp: , Rfl:     fluticasone propionate (FLONASE) 50 mcg/actuation nasal spray, 1 spray by Each Nostril route once daily., Disp: , Rfl:     insulin (LANTUS SOLOSTAR U-100 INSULIN) glargine 100 units/mL (3mL) SubQ pen, Inject 10 Units into the skin every evening., Disp: 1 Box, Rfl: 5    LANCETS & BLOOD GLUCOSE STRIPS MISC, by Misc.(Non-Drug; Combo Route) route 2 (two) times daily., Disp: , Rfl:     lancets (FREESTYLE LANCETS) 28 gauge Misc, 1 Units by Misc.(Non-Drug; Combo Route) route as directed. Check blood sugar 3 to 4 times daily., Disp: 100 each, Rfl: 6    naproxen (NAPROSYN) 250 MG tablet, Take 500 mg by mouth 2 (two) times daily as needed., Disp: , Rfl:     pen needle, diabetic 32 gauge x 3/16" Ndle, 1 Device by Misc.(Non-Drug; Combo Route) route every evening., Disp: 50 each, Rfl: 5    polyethylene glycol (GLYCOLAX) 17 gram PwPk, Take by mouth., Disp: , Rfl:     potassium chloride (MICRO-K) 10 MEQ CpSR, Take 1 capsule (10 mEq total) by mouth once daily., Disp: 30 capsule, Rfl: 5    " "simvastatin (ZOCOR) 40 MG tablet, Take 1 tablet (40 mg total) by mouth every evening., Disp: 30 tablet, Rfl: 11    vit C/vit E/lutein/min/omega-3 (OCUVITE ORAL), Take by mouth., Disp: , Rfl:     furosemide (LASIX) 20 MG tablet, TAKE 1 TABLET(20 MG) PO qd, Disp: 90 tablet, Rfl: 1    glimepiride (AMARYL) 4 MG tablet, Take 1 tablet (4 mg total) by mouth daily with breakfast., Disp: 30 tablet, Rfl: 5    loperamide (IMODIUM) 2 mg capsule, Take 2 mg by mouth 4 (four) times daily as needed for Diarrhea., Disp: , Rfl:     loratadine (CLARITIN) 10 mg tablet, Take 1 tablet (10 mg total) by mouth once daily., Disp: 30 tablet, Rfl: 2    SITagliptin (JANUVIA) 50 MG Tab, Take 1 tablet (50 mg total) by mouth once daily., Disp: 30 tablet, Rfl: 11     REVIEW OF SYSTEMS:  Patient has no fever, fatigue, visual changes, chest pain, edema, cough, dyspnea, nausea, vomiting, constipation, diarrhea, arthralgias, pruritis, dizziness, weakness, depression, confusion.     PHYSICAL EXAM:  Blood pressure 120/80, pulse 84, height 5' 4" (1.626 m), weight 149 lbs  Gen:    WDWN female in no apparent distress  Psych: Normal mood and affect  Skin:    No rashes or ulcers  Eyes:   Normal conjunctiva and lids, PERRLA  ENT:    Normal hearing with no oropharyngeal lesions  Neck:   No JVD  Chest:  Clear with no rales, rhonchi, wheezing with normal effort  CV:      Regular with no murmurs, gallops or rubs  Abd:     Soft, nontender, no distension, positive bowel sounds  Ext:      No cyanosis, clubbing or edema     Labs reviewed, labs pending today.  Last visit labs reviewed.  BMP  Lab Results   Component Value Date     05/09/2019    K 4.8 05/09/2019     05/09/2019    CO2 23 05/09/2019    BUN 24 (H) 05/09/2019    CREATININE 1.3 05/09/2019    CALCIUM 10.0 05/09/2019    ANIONGAP 10 05/09/2019    ESTGFRAFRICA 42 (A) 05/09/2019    EGFRNONAA 36 (A) 05/09/2019     U/a: no protein, no blood, no RBC's, no casts        IMPRESSION AND " RECOMMENDATIONS:  89 y/o female with INDIGO.  The impression is:     1. Renal: s Cr already has improved towards prior baseline. INDIGO resolved  Today's labs pending  INDIGO highly suspect due to aleve (ibuprofen). Analgesic nephropathy  In addition, pt is an elderly and taking an ACE-I at the same time as taking aleve also contributed to decreased renal blood flow  Also, hypotension, may have played a role in INDIGO  Aleve and lisinopril were both stopped  Hyperkalemia: resolved. K normal      NSAIDs can easily decrease renal blood flow in elderly people, specially if hypotension or dehydration is also present.     2. HTN: BP controlled  Suggest do not treat BP aggressively, risk of injury and fall  Meds reviewed  No med changes today     3. Cardiac: h/o of CHF.  Well-compensated on reduced qd dose of lasix  No sx's or signs of fluid overload  Advised pt of low salt and moderate fluid intake in diet.     Plans and recommendations:  As discussed above  Take tylenol as needed instead of ibuprofen  Advised the Assisted Living staff member  RTC 6 months with repeat labs  Total time spent 25 minutes including time needed to review the records, the   patient evaluation, documentation, face-to-face discussion with the patient,   more than 50% of the time was spent on coordination of care and counseling.    Level IV visit.     Deedee Ramirez MD

## 2019-09-26 ENCOUNTER — TELEPHONE (OUTPATIENT)
Dept: NEPHROLOGY | Facility: CLINIC | Age: 84
End: 2019-09-26

## 2019-09-26 NOTE — TELEPHONE ENCOUNTER
----- Message from Belinda Gallegos sent at 9/26/2019  9:15 AM CDT -----  Contact: Vandana   .Type:  Test Results    Who Called:  Vandana   Name of Test (Lab/Mammo/Etc):  Lab   Date of Test:    Ordering Provider:    Where the test was performed:    Would the patient rather a call back or a response via [facility]? Call back   Best Call Back Number:  [phone]   Additional Information:   Pt has the urge to urinate but can't

## 2019-09-26 NOTE — TELEPHONE ENCOUNTER
Returned call to patient's care taker. She would like to know if you can put in an order for UA and culture to check to see if patient have UTI that she is complaining of.

## 2019-10-01 ENCOUNTER — TELEPHONE (OUTPATIENT)
Dept: FAMILY MEDICINE | Facility: CLINIC | Age: 84
End: 2019-10-01

## 2019-10-01 DIAGNOSIS — N30.00 ACUTE CYSTITIS WITHOUT HEMATURIA: Primary | ICD-10-CM

## 2019-10-01 NOTE — TELEPHONE ENCOUNTER
----- Message from Belinda Gallegos sent at 10/1/2019  3:16 PM CDT -----  Contact: Lori whyte/[facility]  Caller request call back UA for CNA pt having urgency and frequency  ... Call back: [phone] fax : [phone]

## 2019-10-01 NOTE — TELEPHONE ENCOUNTER
Spoke with pt daughter and verbalized that  Sent in the UA. And I asked pt daughter does    need to speak to  and she said yes that would great. Just incase if they have any questions

## 2019-10-01 NOTE — TELEPHONE ENCOUNTER
Ok please let Lori know that it is ok to get a UA. I placed orders in epic but do I need to give verbal orders for [facility]?

## 2019-10-08 NOTE — PROGRESS NOTES
"Subjective:       Patient ID: Gwen Durbin is a 89 y.o. female.    Chief Complaint   Patient presents with    Scab on head       HPI    Mrs. Durbin is here today with her caregiver, with some concerns on a lesion to the top of her head.  Area has bled some but that was at her hairdresser appointment, not sure if the area was scraped or cut while she was getting her hair done.  Now with a scabbed over area.  No fever/chills.      Review of Systems   Constitutional: Negative.    Respiratory: Negative.    Cardiovascular: Negative.    Skin: Positive for wound.   Neurological: Negative.          Review of patient's allergies indicates:  No Known Allergies      Medication List with Changes/Refills   Current Medications    AZELASTINE (ASTELIN) 137 MCG (0.1 %) NASAL SPRAY        B COMPLEX VITAMINS TABLET    Take 1 tablet by mouth once daily.    BD ULTRA-FINE SHORT PEN NEEDLE 31 GAUGE X 5/16" NDLE        BENZONATATE (TESSALON) 100 MG CAPSULE    Take 1 capsule (100 mg total) by mouth 3 (three) times daily as needed.    BLOOD SUGAR DIAGNOSTIC (ACCU-CHEK GUIDE) STRP    U UTD TO TEST BID    BLOOD SUGAR DIAGNOSTIC (FREESTYLE LITE STRIPS MISC)    FreeStyle Lite Strips    BLOOD-GLUCOSE METER (PRODIGY VOICE GLUCOSE METER) KIT    Prodigy Voice Glucose Meter kit    BUPROPION (WELLBUTRIN SR) 150 MG TBSR 12 HR TABLET    bupropion HCl  mg tablet,12 hr sustained-release    CIPROFLOXACIN HCL (CILOXAN) 0.3 % OPHTHALMIC SOLUTION    ciprofloxacin 0.3 % eye drops    DOCUSATE SODIUM (COLACE) 100 MG CAPSULE    Take 100 mg by mouth 2 (two) times daily.    DORZOLAMIDE (TRUSOPT) 2 % OPHTHALMIC SOLUTION    dorzolamide 2 % eye drops    ERGOCALCIFEROL (ERGOCALCIFEROL) 50,000 UNIT CAP    Vitamin D2 50,000 unit capsule    FISH OIL-OMEGA-3 FATTY ACIDS 300-1,000 MG CAPSULE    Take 2 g by mouth.    FLUOROMETHOLONE 0.1% (FML S.O.P.) 0.1 % OINT    FML S.O.P. 0.1 % eye ointment    FLUTICASONE PROPIONATE (FLONASE) 50 MCG/ACTUATION NASAL SPRAY    1 " "spray by Each Nostril route once daily.    FUROSEMIDE (LASIX) 20 MG TABLET    furosemide 20 mg tablet    GLIMEPIRIDE (AMARYL) 4 MG TABLET    glimepiride 4 mg tablet    HYDROCODONE-ACETAMINOPHEN (NORCO) 5-325 MG PER TABLET    hydrocodone 5 mg-acetaminophen 325 mg tablet    INSULIN (LANTUS SOLOSTAR U-100 INSULIN) GLARGINE 100 UNITS/ML (3ML) SUBQ PEN    Lantus Solostar U-100 Insulin 100 unit/mL (3 mL) subcutaneous pen    LANCETS (FREESTYLE LANCETS) 28 GAUGE MISC    1 Units by Misc.(Non-Drug; Combo Route) route as directed. Check blood sugar 3 to 4 times daily.    LANCETS (FREESTYLE LANCETS) 28 GAUGE MISC    FreeStyle Lancets 28 gauge    LISINOPRIL 10 MG TABLET    lisinopril 10 mg tablet    LOPERAMIDE (IMODIUM) 2 MG CAPSULE    Take 2 mg by mouth 4 (four) times daily as needed for Diarrhea.    LORATADINE (CLARITIN) 10 MG TABLET    Take 1 tablet (10 mg total) by mouth once daily.    LOTEPREDNOL (LOTEMAX) 0.5 % OPHTHALMIC SUSPENSION    Lotemax 0.5 % eye drops,suspension    LUBIPROSTONE (AMITIZA) 8 MCG CAP    Amitiza 8 mcg capsule    MELOXICAM (MOBIC) 7.5 MG TABLET    meloxicam 7.5 mg tablet    NAPROXEN (NAPROSYN) 250 MG TABLET    naproxen 250 mg tablet    PEN NEEDLE, DIABETIC (BD ULTRA-FINE SHORT PEN NEEDLE) 31 GAUGE X 5/16" NDLE    BD Ultra-Fine Short Pen Needle 31 gauge x 5/16"    PEN NEEDLE, DIABETIC 32 GAUGE X 3/16" NDLE    1 Device by Misc.(Non-Drug; Combo Route) route every evening.    POLYETHYLENE GLYCOL (GLYCOLAX) 17 GRAM PWPK    Take by mouth.    POLYETHYLENE GLYCOL (GLYCOLAX) 17 GRAM/DOSE POWDER    polyethylene glycol 3350 17 gram/dose oral powder    POTASSIUM CHLORIDE (KLOR-CON) 10 MEQ TBSR    potassium chloride ER 10 mEq tablet,extended release    POTASSIUM CHLORIDE (MICRO-K) 10 MEQ CPSR    potassium chloride ER 10 mEq capsule,extended release    SIMVASTATIN (ZOCOR) 40 MG TABLET    simvastatin 40 mg tablet    SITAGLIPTIN (JANUVIA) 50 MG TAB    Januvia 50 mg tablet    VALACYCLOVIR (VALTREX) 1000 MG TABLET    " valacyclovir 1 gram tablet    VIT C/VIT E/LUTEIN/MIN/OMEGA-3 (OCUVITE ORAL)    Take by mouth.    VITAMIN B COMPLEX (B COMPLEX 1 ORAL)    B Complex   Discontinued Medications    AMOXICILLIN (AMOXIL) 500 MG CAPSULE    amoxicillin 500 mg capsule    AZELASTINE 0.15 % (205.5 MCG) SPRY    by Nasal route.    BLOOD SUGAR DIAGNOSTIC, DISC STRP    1 strip by Misc.(Non-Drug; Combo Route) route 2 (two) times daily. Please send Accu Chek Guide    BUPROPION (WELLBUTRIN SR) 150 MG TBSR 12 HR TABLET    Take 1 tablet twice daily.    CIPROFLOXACIN HCL (CIPRO) 500 MG TABLET        DOCUSATE SODIUM (STOOL SOFTENER) 100 MG CAPSULE    Stool Softener    FUROSEMIDE (LASIX) 20 MG TABLET    TAKE 1 TABLET(20 MG) PO qd    GLIMEPIRIDE (AMARYL) 4 MG TABLET    Take 1 tablet (4 mg total) by mouth daily with breakfast.    INSULIN (LANTUS SOLOSTAR U-100 INSULIN) GLARGINE 100 UNITS/ML (3ML) SUBQ PEN    Inject 10 Units into the skin every evening.    LANCETS & BLOOD GLUCOSE STRIPS MISC    by Misc.(Non-Drug; Combo Route) route 2 (two) times daily.    NAPROXEN (NAPROSYN) 250 MG TABLET    Take 500 mg by mouth 2 (two) times daily as needed.    POTASSIUM CHLORIDE (MICRO-K) 10 MEQ CPSR    Take 1 capsule (10 mEq total) by mouth once daily.    SIMVASTATIN (ZOCOR) 40 MG TABLET    Take 1 tablet (40 mg total) by mouth every evening.    SITAGLIPTIN (JANUVIA) 50 MG TAB    Take 1 tablet (50 mg total) by mouth once daily.       Patient Active Problem List   Diagnosis    Hypertension associated with diabetes    Exudative macular degeneration    Aortic valve insufficiency    Lower extremity edema    Gait instability    Age-related osteoporosis with current pathological fracture with routine healing    Tobacco use disorder    Uncontrolled type 2 diabetes mellitus with hyperglycemia    Hearing impaired    Hyperlipidemia associated with type 2 diabetes mellitus    Vitamin D deficiency    Chronic kidney disease (CKD), stage III (moderate)    Osteoarthritis of  "multiple joints         Past medical, surgical, family and social histories have been reviewed today.        Objective:     Vitals:    10/11/19 0941   BP: 117/82   Pulse: 81   Temp: 98 °F (36.7 °C)   Weight: 70.9 kg (156 lb 4.9 oz)   Height: 5' 4" (1.626 m)   PainSc: 0-No pain         Physical Exam   Constitutional: She is oriented to person, place, and time. She appears well-developed and well-nourished. No distress.   HENT:   Head:       Small slightly raised flesh-colored skin lesion with dried blood over area.  Nontender to palpation, no active bleeding or drainage.  Small superficial skin abrasion noted to area.   Neurological: She is alert and oriented to person, place, and time.   Skin: She is not diaphoretic.         Diagnosis       1. Abrasion of skin          Assessment/ Plan     Abrasion of skin  · Skin care discussed, monitor.  · Follow-up in clinic as needed.  · To Derm if s/s worsen.          Future Appointments   Date Time Provider Department Center   3/13/2020 10:30 AM Deed[facility] Ramirez [facility] [place]       Patient Care Team:  Nathalia Dunlap MD as PCP - General (Family Medicine)  Markel Granado LPN as Care Coordinator (Internal Medicine)      PAVITHRA Benavides  [facility]   "

## 2019-10-11 ENCOUNTER — OFFICE VISIT (OUTPATIENT)
Dept: FAMILY MEDICINE | Facility: CLINIC | Age: 84
End: 2019-10-11
Payer: MEDICARE

## 2019-10-11 ENCOUNTER — TELEPHONE (OUTPATIENT)
Dept: FAMILY MEDICINE | Facility: CLINIC | Age: 84
End: 2019-10-11

## 2019-10-11 VITALS
SYSTOLIC BLOOD PRESSURE: 117 MMHG | TEMPERATURE: 98 F | HEART RATE: 81 BPM | BODY MASS INDEX: 26.69 KG/M2 | HEIGHT: 64 IN | WEIGHT: 156.31 LBS | DIASTOLIC BLOOD PRESSURE: 82 MMHG

## 2019-10-11 DIAGNOSIS — T14.8XXA ABRASION OF SKIN: Primary | ICD-10-CM

## 2019-10-11 PROCEDURE — 99213 PR OFFICE/OUTPT VISIT, EST, LEVL III, 20-29 MIN: ICD-10-PCS | Mod: HCNC,S$GLB,, | Performed by: REGISTERED NURSE

## 2019-10-11 PROCEDURE — 1101F PR PT FALLS ASSESS DOC 0-1 FALLS W/OUT INJ PAST YR: ICD-10-PCS | Mod: HCNC,CPTII,S$GLB, | Performed by: REGISTERED NURSE

## 2019-10-11 PROCEDURE — 1101F PT FALLS ASSESS-DOCD LE1/YR: CPT | Mod: HCNC,CPTII,S$GLB, | Performed by: REGISTERED NURSE

## 2019-10-11 PROCEDURE — 99999 PR PBB SHADOW E&M-EST. PATIENT-LVL V: CPT | Mod: PBBFAC,HCNC,, | Performed by: REGISTERED NURSE

## 2019-10-11 PROCEDURE — 99999 PR PBB SHADOW E&M-EST. PATIENT-LVL V: ICD-10-PCS | Mod: PBBFAC,HCNC,, | Performed by: REGISTERED NURSE

## 2019-10-11 PROCEDURE — 99213 OFFICE O/P EST LOW 20 MIN: CPT | Mod: HCNC,S$GLB,, | Performed by: REGISTERED NURSE

## 2019-10-11 RX ORDER — DOCUSATE SODIUM 100 MG/1
CAPSULE, LIQUID FILLED ORAL
COMMUNITY
End: 2019-10-11 | Stop reason: SDUPTHER

## 2019-10-11 RX ORDER — LANCETS 28 GAUGE
EACH MISCELLANEOUS
COMMUNITY
End: 2021-06-18

## 2019-10-11 RX ORDER — SIMVASTATIN 40 MG/1
TABLET, FILM COATED ORAL
COMMUNITY
End: 2020-03-13

## 2019-10-11 RX ORDER — HYDROCODONE BITARTRATE AND ACETAMINOPHEN 5; 325 MG/1; MG/1
TABLET ORAL
COMMUNITY
End: 2020-02-17

## 2019-10-11 RX ORDER — LISINOPRIL 10 MG/1
TABLET ORAL
COMMUNITY
End: 2021-06-18

## 2019-10-11 RX ORDER — POLYETHYLENE GLYCOL 3350 17 G/17G
POWDER, FOR SOLUTION ORAL
COMMUNITY
End: 2020-02-17

## 2019-10-11 RX ORDER — INSULIN PUMP SYRINGE, 3 ML
EACH MISCELLANEOUS
COMMUNITY
End: 2022-01-10

## 2019-10-11 RX ORDER — POTASSIUM CHLORIDE 750 MG/1
CAPSULE, EXTENDED RELEASE ORAL
COMMUNITY
End: 2020-02-17

## 2019-10-11 RX ORDER — POTASSIUM CHLORIDE 750 MG/1
TABLET, EXTENDED RELEASE ORAL
COMMUNITY
End: 2020-02-17

## 2019-10-11 RX ORDER — CIPROFLOXACIN 500 MG/1
TABLET ORAL
COMMUNITY
Start: 2019-10-04 | End: 2019-10-11

## 2019-10-11 RX ORDER — PEN NEEDLE, DIABETIC 31 GX5/16"
NEEDLE, DISPOSABLE MISCELLANEOUS
COMMUNITY
Start: 2019-07-11 | End: 2021-02-11

## 2019-10-11 RX ORDER — MELOXICAM 7.5 MG/1
TABLET ORAL
COMMUNITY
End: 2021-06-10

## 2019-10-11 RX ORDER — PEN NEEDLE, DIABETIC 30 GX3/16"
NEEDLE, DISPOSABLE MISCELLANEOUS
COMMUNITY
End: 2021-02-11

## 2019-10-11 RX ORDER — CIPROFLOXACIN HYDROCHLORIDE 3 MG/ML
SOLUTION/ DROPS OPHTHALMIC
COMMUNITY
End: 2021-06-10

## 2019-10-11 RX ORDER — FUROSEMIDE 20 MG/1
TABLET ORAL
COMMUNITY
End: 2020-02-17

## 2019-10-11 RX ORDER — AMOXICILLIN 500 MG/1
CAPSULE ORAL
COMMUNITY
End: 2019-10-11

## 2019-10-11 RX ORDER — VALACYCLOVIR HYDROCHLORIDE 1 G/1
TABLET, FILM COATED ORAL
COMMUNITY
End: 2021-06-18

## 2019-10-11 RX ORDER — LOTEPREDNOL ETABONATE 5 MG/ML
SUSPENSION/ DROPS OPHTHALMIC
COMMUNITY

## 2019-10-11 RX ORDER — INSULIN GLARGINE 100 [IU]/ML
INJECTION, SOLUTION SUBCUTANEOUS
COMMUNITY
End: 2020-09-06

## 2019-10-11 RX ORDER — BUPROPION HYDROCHLORIDE 150 MG/1
TABLET, EXTENDED RELEASE ORAL
COMMUNITY
Start: 2018-01-11 | End: 2020-05-14

## 2019-10-11 RX ORDER — DORZOLAMIDE HCL 20 MG/ML
SOLUTION/ DROPS OPHTHALMIC
COMMUNITY

## 2019-10-11 RX ORDER — AZELASTINE 1 MG/ML
SPRAY, METERED NASAL
COMMUNITY
Start: 2019-07-12

## 2019-10-11 RX ORDER — LUBIPROSTONE 8 UG/1
CAPSULE ORAL
COMMUNITY
End: 2021-06-18

## 2019-10-11 RX ORDER — NAPROXEN 250 MG/1
TABLET ORAL
COMMUNITY
End: 2022-01-10

## 2019-10-11 RX ORDER — ERGOCALCIFEROL 1.25 MG/1
CAPSULE ORAL
COMMUNITY
End: 2020-02-17

## 2019-10-11 RX ORDER — GLIMEPIRIDE 4 MG/1
TABLET ORAL
COMMUNITY
End: 2020-02-17

## 2019-10-11 NOTE — TELEPHONE ENCOUNTER
----- Message from Flower Barba sent at 10/11/2019  1:04 PM CDT -----  Contact: Noelle Saucedo/[facility]  She has a few questions about orders. Her # is [phone]. She stated that she gave the fax number the first time instead of the phone #.           Thanks,  Flower Barba

## 2019-10-11 NOTE — TELEPHONE ENCOUNTER
----- Message from Pippa Asencio sent at 10/11/2019 12:49 PM CDT -----  Contact: [facility]   States she's calling to verify orders that were sent and also the pt mentioned some medicine that the pt should take and needs to clarify that information and can be reached at [phone]//thanks/dbw

## 2019-10-11 NOTE — TELEPHONE ENCOUNTER
Called Court back and spoke with woody . She advised that she cannot connect me to Court because she don't have her information and cannot transfer calls.

## 2019-11-19 ENCOUNTER — OFFICE VISIT (OUTPATIENT)
Dept: FAMILY MEDICINE | Facility: CLINIC | Age: 84
End: 2019-11-19
Payer: MEDICARE

## 2019-11-19 ENCOUNTER — PATIENT OUTREACH (OUTPATIENT)
Dept: ADMINISTRATIVE | Facility: HOSPITAL | Age: 84
End: 2019-11-19

## 2019-11-19 ENCOUNTER — LAB VISIT (OUTPATIENT)
Dept: LAB | Facility: HOSPITAL | Age: 84
End: 2019-11-19
Attending: FAMILY MEDICINE
Payer: MEDICARE

## 2019-11-19 VITALS
TEMPERATURE: 98 F | SYSTOLIC BLOOD PRESSURE: 146 MMHG | RESPIRATION RATE: 17 BRPM | HEIGHT: 64 IN | BODY MASS INDEX: 26.64 KG/M2 | OXYGEN SATURATION: 97 % | HEART RATE: 62 BPM | DIASTOLIC BLOOD PRESSURE: 86 MMHG | WEIGHT: 156.06 LBS

## 2019-11-19 DIAGNOSIS — I35.1 AORTIC VALVE INSUFFICIENCY, ETIOLOGY OF CARDIAC VALVE DISEASE UNSPECIFIED: ICD-10-CM

## 2019-11-19 DIAGNOSIS — E11.65 UNCONTROLLED TYPE 2 DIABETES MELLITUS WITH HYPERGLYCEMIA: Primary | Chronic | ICD-10-CM

## 2019-11-19 DIAGNOSIS — I15.2 HYPERTENSION ASSOCIATED WITH DIABETES: Chronic | ICD-10-CM

## 2019-11-19 DIAGNOSIS — E55.9 VITAMIN D DEFICIENCY: ICD-10-CM

## 2019-11-19 DIAGNOSIS — Z87.891 HISTORY OF TOBACCO ABUSE: ICD-10-CM

## 2019-11-19 DIAGNOSIS — M15.9 PRIMARY OSTEOARTHRITIS INVOLVING MULTIPLE JOINTS: ICD-10-CM

## 2019-11-19 DIAGNOSIS — H35.3290 EXUDATIVE AGE-RELATED MACULAR DEGENERATION, UNSPECIFIED LATERALITY, UNSPECIFIED STAGE: ICD-10-CM

## 2019-11-19 DIAGNOSIS — E78.5 HYPERLIPIDEMIA ASSOCIATED WITH TYPE 2 DIABETES MELLITUS: Chronic | ICD-10-CM

## 2019-11-19 DIAGNOSIS — E11.69 HYPERLIPIDEMIA ASSOCIATED WITH TYPE 2 DIABETES MELLITUS: Chronic | ICD-10-CM

## 2019-11-19 DIAGNOSIS — M80.00XD AGE-RELATED OSTEOPOROSIS WITH CURRENT PATHOLOGICAL FRACTURE WITH ROUTINE HEALING: ICD-10-CM

## 2019-11-19 DIAGNOSIS — N18.30 CHRONIC KIDNEY DISEASE (CKD), STAGE III (MODERATE): ICD-10-CM

## 2019-11-19 DIAGNOSIS — E11.59 HYPERTENSION ASSOCIATED WITH DIABETES: Chronic | ICD-10-CM

## 2019-11-19 DIAGNOSIS — E11.65 UNCONTROLLED TYPE 2 DIABETES MELLITUS WITH HYPERGLYCEMIA: Chronic | ICD-10-CM

## 2019-11-19 DIAGNOSIS — Z00.00 ENCOUNTER FOR PREVENTIVE HEALTH EXAMINATION: Primary | ICD-10-CM

## 2019-11-19 PROCEDURE — 83036 HEMOGLOBIN GLYCOSYLATED A1C: CPT | Mod: HCNC

## 2019-11-19 PROCEDURE — 36415 COLL VENOUS BLD VENIPUNCTURE: CPT | Mod: HCNC,PO

## 2019-11-19 PROCEDURE — G0439 PPPS, SUBSEQ VISIT: HCPCS | Mod: HCNC,S$GLB,, | Performed by: NURSE PRACTITIONER

## 2019-11-19 PROCEDURE — G0439 PR MEDICARE ANNUAL WELLNESS SUBSEQUENT VISIT: ICD-10-PCS | Mod: HCNC,S$GLB,, | Performed by: NURSE PRACTITIONER

## 2019-11-19 PROCEDURE — 99499 UNLISTED E&M SERVICE: CPT | Mod: HCNC,S$GLB,, | Performed by: NURSE PRACTITIONER

## 2019-11-19 PROCEDURE — 99499 RISK ADDL DX/OHS AUDIT: ICD-10-PCS | Mod: HCNC,S$GLB,, | Performed by: NURSE PRACTITIONER

## 2019-11-19 PROCEDURE — 99999 PR PBB SHADOW E&M-EST. PATIENT-LVL V: CPT | Mod: PBBFAC,HCNC,, | Performed by: NURSE PRACTITIONER

## 2019-11-19 PROCEDURE — 99999 PR PBB SHADOW E&M-EST. PATIENT-LVL V: ICD-10-PCS | Mod: PBBFAC,HCNC,, | Performed by: NURSE PRACTITIONER

## 2019-11-19 NOTE — PROGRESS NOTES
"Gwen Durbin presented for a  Medicare AWV and comprehensive Health Risk Assessment today. The following components were reviewed and updated:  Patient accompanied by sitter from [facility]  , who was present throughout the visit and aided in information gathering.        · Medical history  · Family History  · Social history  · Allergies and Current Medications  · Health Risk Assessment  · Health Maintenance  · Care Team     ** See Completed Assessments for Annual Wellness Visit within the encounter summary.**     The following assessments were completed:  · Living Situation  · CAGE  · Depression Screening  · Timed Get Up and Go  · Whisper Test  · Cognitive Function Screening  · Nutrition Screening  · ADL Screening  · PAQ Screening    Vitals:    11/19/19 1017 11/19/19 1050   BP: (!) 150/76 (!) 146/86   BP Location: Left arm    Patient Position: Sitting    BP Method: Medium (Manual)    Pulse: 62 62   Resp: 17    Temp: 98.1 °F (36.7 °C)    TempSrc: Oral    SpO2: 97%    Weight: 70.8 kg (156 lb 1.4 oz)    Height: 5' 4" (1.626 m)      Body mass index is 26.79 kg/m².  Physical Exam   Constitutional: She appears well-developed and well-nourished.   Hard of hearing    HENT:   Head: Normocephalic and atraumatic.   Eyes: Pupils are equal, round, and reactive to light.   Neck: Carotid bruit is not present.   Cardiovascular: Normal rate, regular rhythm, normal heart sounds, intact distal pulses and normal pulses. Exam reveals no gallop.   No murmur heard.  Pulmonary/Chest: Effort normal and breath sounds normal.   Abdominal: Soft. Normal appearance and bowel sounds are normal. She exhibits no distension. There is no tenderness.   Musculoskeletal: Normal range of motion. She exhibits no edema or tenderness.   Neurological: She is alert. She exhibits normal muscle tone. Gait abnormal.   Pt in wheel chair   Skin: Skin is warm, dry and intact.   Psychiatric: She has a normal mood and affect. Her speech is " normal and behavior is normal. Judgment and thought content normal. Cognition and memory are normal.   Nursing note and vitals reviewed.        Diagnoses and health risks identified today and associated recommendations/orders:    1. Encounter for preventive health examination  Completed     2. Uncontrolled type 2 diabetes mellitus with hyperglycemia  Component      Latest Ref Rng & Units 1/3/2019   Hemoglobin A1C External      4.0 - 5.6 % 7.4 (H)   Estimated Avg Glucose      68 - 131 mg/dL 166 (H)   Chronic and Ongoing on Amaryl and Lantus.. Will check HGBAIC today . Follow up with PCP for results    3. Hypertension associated with diabetes  Chronic and ongoing on Lisinopril  And  Lasix for edema. Continue current treatment plan as previously prescribed with your PCP and nephrologist    4. Hyperlipidemia associated with type 2 diabetes mellitus  Component      Latest Ref Rng & Units 4/11/2019   Cholesterol      120 - 199 mg/dL 163   Triglycerides      30 - 150 mg/dL 165 (H)   HDL      40 - 75 mg/dL 48   LDL Cholesterol External      63.0 - 159.0 mg/dL 82.0   Hdl/Cholesterol Ratio      20.0 - 50.0 % 29.4   Total Cholesterol/HDL Ratio      2.0 - 5.0 3.4   Non-HDL Cholesterol      mg/dL 115   Chronic and  Ongoing on Zocor and fish oil tab. Continue current treatment plan as previously prescribed with your PCP    5. Chronic kidney disease (CKD), stage III (moderate)   Creatinine 0.5 - 1.4 mg/dL 1.9High   1.7High   1.3   Chronic and Stable. INDIGO resolved  Continue current treatment plan as previously prescribed with your nephrplogist     6. Exudative age-related macular degeneration, unspecified laterality, unspecified stage  Chronic and Stable. Continue current treatment plan as previously prescribed with your Optholomogist     7. Aortic valve insufficiency, etiology of cardiac valve disease unspecified  Chronic and Stable. Continue current treatment plan as previously prescribed with your PCP    8. Age-related  osteoporosis with current pathological fracture with routine healing  Chronic and Stable decline treatment. - denies recent falls or injuries   Last DEXA 2013 and decline additional DEXA. Takes vitamin D and calcium . Followed by PCP     9. Vitamin D deficiency  Chronic and Stable on vitamin D . Continue current treatment plan as previously prescribed with your PCP    10. Primary osteoarthritis involving multiple joints  Chronic and Stable on Mobic Continue current treatment plan as previously prescribed with your PCP    11. History of tobacco abuse  Chronic and Stable  Pt states she no longer smokes  Since November 2017. .....Takes Wellbutrin for smoking cessation. Followed by PCP       I offered to discuss end of life issues, including information on how to make advance directives that the patient could use to name someone who would make medical decisions on their behalf if they became too ill to make themselves.  _Patient declined-done    Provided Gwen with a 5-10 year written screening schedule and personal prevention plan. Recommendations were developed using the USPSTF age appropriate recommendations. Education, counseling, and referrals were provided as needed. After Visit Summary printed and given to patient which includes a list of additional screenings\tests needed.    Follow up in about 1 year (around 11/19/2020).    Stacey Ford NP

## 2019-11-19 NOTE — PATIENT INSTRUCTIONS
Counseling and Referral of Other Preventative  (Italic type indicates deductible and co-insurance are waived)    Patient Name: Gwen Durbin  Today's Date: 11/19/2019    Health Maintenance       Date Due Completion Date    Shingles Vaccine (1 of 2) [birth year]06/1978 ---    Hemoglobin A1c 07/03/2019 1/3/2019    Eye Exam 12/21/2019 12/21/2018    Override on 1/10/2017: Done    Override on 4/23/2015: Done    Foot Exam 04/09/2020 4/9/2019 (Done)    Override on 4/9/2019: Done    Override on 4/30/2018: Done    Override on 8/8/2017: Done    Override on 6/14/2016: Done    Override on 4/30/2015: Done    Lipid Panel 04/11/2020 4/11/2019    TETANUS VACCINE 03/07/2027 3/7/2017 (Declined)    Override on 3/7/2017: Declined        No orders of the defined types were placed in this encounter.    The following information is provided to all patients.  This information is to help you find resources for any of the problems found today that may be affecting your health:                Living healthy guide: www.[facility].[place].[facility]      Understanding Diabetes: www.diabetes.org      Eating healthy: www.cdc.gov/healthyweight      CDC home safety checklist: www.cdc.gov/steadi/patient.html      Agency on Aging: www.goea.[place].[facility]      Alcoholics anonymous (AA): www.aa.org      Physical Activity: www.tello.nih.gov/qz2zfge      Tobacco use: www.quitwithusla.org     Counseling and Referral of Other Preventative  (Italic type indicates deductible and co-insurance are waived)    Patient Name: Gwen Durbin  Today's Date: 11/20/2019    Health Maintenance       Date Due Completion Date    Shingles Vaccine (1 of 2) 10/06/1978 ---    Eye Exam 12/21/2019 12/21/2018    Override on 1/10/2017: Done    Override on 4/23/2015: Done    Foot Exam 04/09/2020 4/9/2019 (Done)    Override on 4/9/2019: Done    Override on 4/30/2018: Done    Override on 8/8/2017: Done    Override on 6/14/2016: Done    Override on 4/30/2015: Done    Lipid Panel 04/11/2020 4/11/2019     Hemoglobin A1c 05/19/2020 11/19/2019    TETANUS VACCINE 03/07/2027 3/7/2017 (Declined)    Override on 3/7/2017: Declined        No orders of the defined types were placed in this encounter.    The following information is provided to all patients.  This information is to help you find resources for any of the problems found today that may be affecting your health:                Living healthy guide: www.[facility].[place].[facility]      Understanding Diabetes: www.diabetes.org      Eating healthy: www.cdc.gov/healthyweight      [facility] home safety checklist: www.cdc.gov/steadi/patient.html      Agency on Aging: www.goea.[place].[facility]      Alcoholics anonymous (AA): www.aa.org      Physical Activity: www.tello.nih.gov/xj8fjms      Tobacco use: www.quitwithusla.org

## 2019-11-20 LAB
ESTIMATED AVG GLUCOSE: 157 MG/DL (ref 68–131)
HBA1C MFR BLD HPLC: 7.1 % (ref 4–5.6)

## 2019-12-09 ENCOUNTER — TELEPHONE (OUTPATIENT)
Dept: FAMILY MEDICINE | Facility: CLINIC | Age: 84
End: 2019-12-09

## 2019-12-09 NOTE — TELEPHONE ENCOUNTER
Spoke with pt's daughter and she stated that she no longer needs to speak with us bc pt was told to go to the ER. Message handled.

## 2019-12-09 NOTE — TELEPHONE ENCOUNTER
----- Message from Shelli Miranda sent at 12/9/2019 12:49 PM CST -----  Contact: pt daughter - Ms Bartlett   Stated she about pt having diarrhea, she can be reached at [phone] Thanks

## 2019-12-11 ENCOUNTER — TELEPHONE (OUTPATIENT)
Dept: FAMILY MEDICINE | Facility: CLINIC | Age: 84
End: 2019-12-11

## 2019-12-11 NOTE — TELEPHONE ENCOUNTER
You can tell her the following:  Ms. Durbin has had blood work drawn 6 times this year between me and Dr. Ramirez, her kidney doctor.  I think that we are on top of it.  If he would like a copy of the lab, he can obtain that with the patient's permission.  Ms. Durbin usually gets lab drawn when she comes for her visit.  She has had 8 visits this year.  Are they implying that she can no longer come to the clinic?

## 2019-12-11 NOTE — TELEPHONE ENCOUNTER
Pt's daughter states that her brother is wanting their mother to get annual blood test drawn every 3 months just to make sure everything ok with her more often instead of every year. She states that they spoke with one of the nurses at [facility] and they stated that [facility] will send someone out to the pt to have the blood work drawn, but an order is needed from you to do so. She states that there is no doctor requesting this blood work to be done, just her brother who is very concerned with their mother.

## 2019-12-16 ENCOUNTER — TELEPHONE (OUTPATIENT)
Dept: FAMILY MEDICINE | Facility: CLINIC | Age: 84
End: 2019-12-16

## 2019-12-16 DIAGNOSIS — M80.00XD AGE-RELATED OSTEOPOROSIS WITH CURRENT PATHOLOGICAL FRACTURE WITH ROUTINE HEALING: ICD-10-CM

## 2019-12-16 DIAGNOSIS — R54 AGE-RELATED PHYSICAL DEBILITY: Primary | ICD-10-CM

## 2019-12-16 DIAGNOSIS — R26.81 GAIT INSTABILITY: ICD-10-CM

## 2019-12-16 DIAGNOSIS — E11.65 UNCONTROLLED TYPE 2 DIABETES MELLITUS WITH HYPERGLYCEMIA: Chronic | ICD-10-CM

## 2019-12-16 NOTE — TELEPHONE ENCOUNTER
----- Message from Jeanette Galloway sent at 12/16/2019  8:30 AM CST -----  Contact: Daughter, Snehal Brito [phone]  Please call regarding the lab orders for [facility].

## 2019-12-16 NOTE — TELEPHONE ENCOUNTER
Spoke with pt's daughter and informed her of Dr. Dunlap's comment towards pt's blood work. With understanding.

## 2019-12-16 NOTE — TELEPHONE ENCOUNTER
Pt's daughter is request home health for pt to have physical therapy at home. She states that [facility] feels that it is best for pt to stay at home with everything that's going on with her bowel incontinents, they also stated that they would discharge her so that way she could get home health with physical therapy. Pt's daughter states that pt really enjoys PT and would like to continue it while at home. Pt is also dealing with weakness as well. She states that you have done an order for home health once before and would like another one for  [facility].  Fax: [phone]  Phone: [phone]    [facility]  [address], [place] [address]    [address], [place] [address]

## 2019-12-16 NOTE — TELEPHONE ENCOUNTER
----- Message from Shira Dc sent at 12/16/2019  1:55 PM CST -----  Contact: [facility] Pt daughter feels that the pt now need home Health care .   Pt call back [phone] Cynthia Coleman

## 2019-12-23 ENCOUNTER — TELEPHONE (OUTPATIENT)
Dept: FAMILY MEDICINE | Facility: CLINIC | Age: 84
End: 2019-12-23

## 2019-12-23 NOTE — TELEPHONE ENCOUNTER
----- Message from Elizabeth Flores sent at 12/23/2019 11:27 AM CST -----  Contact: [facility]/Lori  Please call nurse @ [phone] regarding an order for urine specimen, pt is confuse more than normal

## 2019-12-23 NOTE — TELEPHONE ENCOUNTER
Lori From [facility] is requesting a UA order for pt to be collected at [facility], she states that pt has been experiencing some confusion a little more than normal and they would like to do UA. Pt has a history of bladder infections.

## 2019-12-27 ENCOUNTER — TELEPHONE (OUTPATIENT)
Dept: FAMILY MEDICINE | Facility: CLINIC | Age: 84
End: 2019-12-27

## 2019-12-27 NOTE — TELEPHONE ENCOUNTER
----- Message from Tasia Coleman sent at 12/27/2019 11:48 AM CST -----  Contact: Lori/[facility] [phone]  States that she is calling to verify the the UA results have been received and it Dr Dunlap is going to prescribe antibiotics or if the UA needs to be repeated. Please call back at [phone]//thank you acc

## 2020-01-09 ENCOUNTER — TELEPHONE (OUTPATIENT)
Dept: FAMILY MEDICINE | Facility: CLINIC | Age: 85
End: 2020-01-09

## 2020-01-09 DIAGNOSIS — R26.81 GAIT INSTABILITY: Primary | ICD-10-CM

## 2020-01-09 DIAGNOSIS — R54 AGE-RELATED PHYSICAL DEBILITY: ICD-10-CM

## 2020-01-09 NOTE — TELEPHONE ENCOUNTER
Last annual: 4/9/19  Snehal (daughter) is calling to request orders for PT on the [facility] with [facility]. They have services on the campus there.  [facility] never contacted them, but patient would have had to leave campus for their services, so they've decided to stay with the in-house company.  I advised that I'll give her a call back when done.    ----- Message from Flower Barba sent at 1/9/2020 12:53 PM CST -----  Contact: pt daughter  Daughter called to get orders for therapy at [facility]  sent over to them at ([phone]. Snehal can be reached at [phone]        Thanks,  Flower Barba

## 2020-01-10 ENCOUNTER — TELEPHONE (OUTPATIENT)
Dept: FAMILY MEDICINE | Facility: CLINIC | Age: 85
End: 2020-01-10

## 2020-01-10 NOTE — TELEPHONE ENCOUNTER
Advised Fidelina that she can disregard referral sent in error.   ----- Message from Flower Barba sent at 1/10/2020 12:47 PM CST -----  Contact: Fidelina/[facility] [phone]  Referral was sent to [facility] however they need some clinical notes and can fax it over to [phone]. They would like to know what services need to be rendered.      Thanks,  Flower Barba

## 2020-01-10 NOTE — TELEPHONE ENCOUNTER
[facility]  [phone] referral faxed  Phone [phone] (Colleen- [facility] counselor) she confirmed the fax number and location  Snehal advised that referral has been faxed

## 2020-01-10 NOTE — TELEPHONE ENCOUNTER
Home Health order was supposed to go to [facility], located within the [facility] where the patient lives.

## 2020-01-16 ENCOUNTER — TELEPHONE (OUTPATIENT)
Dept: FAMILY MEDICINE | Facility: CLINIC | Age: 85
End: 2020-01-16

## 2020-01-16 NOTE — TELEPHONE ENCOUNTER
----- Message from Pamella Danielle sent at 1/16/2020 12:54 PM CST -----  Contact: [facility]   Ms. Sandoval called in regards to the patient wanting to start physical therapy again . Please call back at [phone].      Thanks,  Pamella Danielle

## 2020-01-16 NOTE — TELEPHONE ENCOUNTER
Home health order went through [facility] this order is for physical therapy. I have the hard copy of physical therapy order waiting for your signature and will fax it back

## 2020-01-24 ENCOUNTER — TELEPHONE (OUTPATIENT)
Dept: FAMILY MEDICINE | Facility: CLINIC | Age: 85
End: 2020-01-24

## 2020-01-24 DIAGNOSIS — E11.65 UNCONTROLLED TYPE 2 DIABETES MELLITUS WITH HYPERGLYCEMIA: Chronic | ICD-10-CM

## 2020-01-24 DIAGNOSIS — L89.309 PRESSURE INJURY OF SKIN OF BUTTOCK, UNSPECIFIED INJURY STAGE, UNSPECIFIED LATERALITY: Primary | ICD-10-CM

## 2020-01-24 NOTE — TELEPHONE ENCOUNTER
M for Radha: Because I myself cannot evaluate the wound, use standard wound care procedure with application of antibiotic ointment. Let me know if home health needs to provide a wound care nurse.    I asked her to give me a call back to confirm she has received the message, or I will be calling back.

## 2020-01-24 NOTE — TELEPHONE ENCOUNTER
M for Radha to give me a call back  ----- Message from Nilesh Ramos sent at 1/24/2020 11:41 AM CST -----  Contact: [facility] (Radha)   Pt has an area of breakdown on buttocks and skin is broken would like to get orders .         Pt [phone]   Fax [phone]

## 2020-01-24 NOTE — TELEPHONE ENCOUNTER
Radha at [facility] said yes, she would like an order for a home health nurse to come out for wound care on the patient. Please fax order to:  [facility]   [address]  [place] [address]  Phone: [phone]  Fax: [phone]

## 2020-01-24 NOTE — TELEPHONE ENCOUNTER
Because I myself cannot evaluate the wound, use standard wound care procedure with application of antibiotic ointment. Let me know if home health needs to provide a wound care nurse.

## 2020-01-24 NOTE — TELEPHONE ENCOUNTER
Lori called advised that [facility] doesn't accept [employer], so please send to [facility], who the patient has had services with before.     [facility]

## 2020-01-27 ENCOUNTER — TELEPHONE (OUTPATIENT)
Dept: FAMILY MEDICINE | Facility: CLINIC | Age: 85
End: 2020-01-27

## 2020-01-27 NOTE — TELEPHONE ENCOUNTER
Lisa @ [facility] advised that:  -pt and family are requesting to dc all vitamins  (fish oil, vitD, b-complex, and ocuvite); please fax order to dc to them at [phone]    -also send them a copy of Home Health referral to them as well as to [facility].- done    ----- Message from Elizabeth Flores sent at 1/27/2020  1:15 PM CST -----  Contact: Nurse/Lori  Please call Lori @ [phone] regarding pt, states she talked to nurse on Friday, have more questions

## 2020-01-28 ENCOUNTER — TELEPHONE (OUTPATIENT)
Dept: FAMILY MEDICINE | Facility: CLINIC | Age: 85
End: 2020-01-28

## 2020-01-28 NOTE — TELEPHONE ENCOUNTER
----- Message from Coco Dempsey sent at 1/28/2020 10:37 AM CST -----  [facility]   Requesting a call back regarding needed clinic notes.Please call back at [phone].Please  FAX [phone].      Thanks,  Coco Dempsey

## 2020-01-30 ENCOUNTER — TELEPHONE (OUTPATIENT)
Dept: FAMILY MEDICINE | Facility: CLINIC | Age: 85
End: 2020-01-30

## 2020-01-30 NOTE — TELEPHONE ENCOUNTER
----- Message from Nilesh Ramos sent at 1/30/2020  2:14 PM CST -----  Contact: suyapa pelayo   Pt is in assisted living and has a pressure sore and nurse is asking for patches to be called in, home health nurse declined to do the patches. pls return call to let daughter know what they need to do to get some.      [phone]

## 2020-01-30 NOTE — TELEPHONE ENCOUNTER
Spoke with pt's daughter, she stated she already received patches from another provider. Call is no longer needed.

## 2020-02-05 ENCOUNTER — TELEPHONE (OUTPATIENT)
Dept: FAMILY MEDICINE | Facility: CLINIC | Age: 85
End: 2020-02-05

## 2020-02-05 NOTE — TELEPHONE ENCOUNTER
Alexia Turner (at [facility]) advised that patient was not back yet, but will call me when she is to scheduled her 1 week follow-up    ----- Message from Florence Christine sent at 2/5/2020  9:55 AM CST -----  ..Type:  Sooner Apoointment Request    Caller is requesting a sooner appointment.  Caller declined first available appointment listed below.  Caller will not accept being placed on the waitlist and is requesting a message be sent to doctor.  Name of Caller:[facility]   When is the first available appointment?03/09  Symptoms:1 week hospital follow up  Would the patient rather a call back or a response via [facility]?   Best Call Back Number:.[phone] (home) [phone] (work)      Additional Information:

## 2020-02-06 ENCOUNTER — TELEPHONE (OUTPATIENT)
Dept: FAMILY MEDICINE | Facility: CLINIC | Age: 85
End: 2020-02-06

## 2020-02-06 NOTE — TELEPHONE ENCOUNTER
Filiberto at [facility] advised that patient was sent home with Wellbutrin SR 150mg tablets taking once daily. She wants to know if that's correct and can be continued, or if it should be taken differently.  Please advise.     ----- Message from Katelyn Barnard sent at 2/6/2020 11:36 AM CST -----  Contact: [facility]   Type:  Needs Medical Advice    Who Called:  filiberto  Symptoms (please be specific):   How long has patient had these symptoms:   Pharmacy name and phone #:   Would the patient rather a call back or a response via [facility]? Call   Best Call Back Number:  [phone]  Additional Information:   Caller is requesting a call back  from the nurse in regards to the pt med  WELLBUTRIN SR and how she is to be taking this med please

## 2020-02-10 ENCOUNTER — TELEPHONE (OUTPATIENT)
Dept: FAMILY MEDICINE | Facility: CLINIC | Age: 85
End: 2020-02-10

## 2020-02-10 NOTE — TELEPHONE ENCOUNTER
Wellbutrin SR should be 150 mg twice a day.  Did they change her to the XL which is once a day?  I have never prescribed Bentyl.  Why is she taking this?  I do not think she should stop the Ocuvite if she has macular degeneration.  I am sure that was prescribed by the ophthalmologist.  If they want to stop fish oil and vitamin B that is up to them.

## 2020-02-10 NOTE — TELEPHONE ENCOUNTER
Lori stated that pt's care taker will be scheduling pt's appt. But is abdon for the day and will call tomorrow .

## 2020-02-10 NOTE — TELEPHONE ENCOUNTER
----- Message from Jhoana Joe sent at 2/10/2020  1:23 PM CST -----  [facility]  Requesting a call back about medication changes for Cymbalta and Edwina. Please call Lori back at  [phone]

## 2020-02-10 NOTE — TELEPHONE ENCOUNTER
Lori whyte/ [facility] states that she was calling to clarify pt's medication, Wellbutrin and  Bentil. She states that the pt has been taking them twice daily before pt went to hospital and she states that when she was released from the hospital the directions for those medications were for her to take the medication once daily and she wants to know how should the pt be taking this medication. She also states that the pt's family wants discontinue pt's fish oil supplement, ocuvite, and vitamin b, and wants to know if that is ok to do so.

## 2020-02-11 NOTE — TELEPHONE ENCOUNTER
Lori was advised and asked:   Wellbutrin SR should be 150 mg twice a day.  Did they change her to the XL which is once a day? They did, but she will put her back to SR BID  I have never prescribed Bentyl.  Why is she taking this? She thinks the patient at one time complained of abdominal pain, upset stomach, diarrhea, and/or IBS It's only given PRN now and she thinks that's best.  I do not think she should stop the Ocuvite if she has macular degeneration.  I am sure that was prescribed by the ophthalmologist.    If they want to stop fish oil and vitamin B that is up to them.   She verbally verified understanding, and will fax over the verbal order for Dr. Dunlap to sign and return. Fax#provided ([phone])

## 2020-02-11 NOTE — TELEPHONE ENCOUNTER
LVM on the 's vm asking the caretaker of pt to give me a call to schedule patient's hospital follow-up appt.

## 2020-02-12 ENCOUNTER — TELEPHONE (OUTPATIENT)
Dept: FAMILY MEDICINE | Facility: CLINIC | Age: 85
End: 2020-02-12

## 2020-02-17 ENCOUNTER — LAB VISIT (OUTPATIENT)
Dept: LAB | Facility: HOSPITAL | Age: 85
End: 2020-02-17
Attending: INTERNAL MEDICINE
Payer: MEDICARE

## 2020-02-17 ENCOUNTER — OFFICE VISIT (OUTPATIENT)
Dept: FAMILY MEDICINE | Facility: CLINIC | Age: 85
End: 2020-02-17
Payer: MEDICARE

## 2020-02-17 VITALS
TEMPERATURE: 98 F | OXYGEN SATURATION: 96 % | HEIGHT: 64 IN | HEART RATE: 70 BPM | BODY MASS INDEX: 26.79 KG/M2 | SYSTOLIC BLOOD PRESSURE: 134 MMHG | DIASTOLIC BLOOD PRESSURE: 70 MMHG

## 2020-02-17 DIAGNOSIS — R41.0 CONFUSION: Primary | ICD-10-CM

## 2020-02-17 DIAGNOSIS — E78.5 HYPERLIPIDEMIA ASSOCIATED WITH TYPE 2 DIABETES MELLITUS: Chronic | ICD-10-CM

## 2020-02-17 DIAGNOSIS — E11.69 HYPERLIPIDEMIA ASSOCIATED WITH TYPE 2 DIABETES MELLITUS: Chronic | ICD-10-CM

## 2020-02-17 DIAGNOSIS — I15.2 HYPERTENSION ASSOCIATED WITH DIABETES: Chronic | ICD-10-CM

## 2020-02-17 DIAGNOSIS — R53.1 WEAKNESS: ICD-10-CM

## 2020-02-17 DIAGNOSIS — H35.3290 EXUDATIVE AGE-RELATED MACULAR DEGENERATION, UNSPECIFIED LATERALITY, UNSPECIFIED STAGE: ICD-10-CM

## 2020-02-17 DIAGNOSIS — E11.65 UNCONTROLLED TYPE 2 DIABETES MELLITUS WITH HYPERGLYCEMIA: Chronic | ICD-10-CM

## 2020-02-17 DIAGNOSIS — E11.59 HYPERTENSION ASSOCIATED WITH DIABETES: Chronic | ICD-10-CM

## 2020-02-17 DIAGNOSIS — N18.30 CHRONIC KIDNEY DISEASE (CKD), STAGE III (MODERATE): ICD-10-CM

## 2020-02-17 DIAGNOSIS — N18.30 CHRONIC KIDNEY DISEASE, STAGE III (MODERATE): ICD-10-CM

## 2020-02-17 LAB — GLUCOSE SERPL-MCNC: 168 MG/DL (ref 70–110)

## 2020-02-17 PROCEDURE — 82962 GLUCOSE BLOOD TEST: CPT | Mod: HCNC,S$GLB,, | Performed by: FAMILY MEDICINE

## 2020-02-17 PROCEDURE — 1159F PR MEDICATION LIST DOCUMENTED IN MEDICAL RECORD: ICD-10-PCS | Mod: HCNC,S$GLB,, | Performed by: FAMILY MEDICINE

## 2020-02-17 PROCEDURE — 99214 PR OFFICE/OUTPT VISIT, EST, LEVL IV, 30-39 MIN: ICD-10-PCS | Mod: HCNC,S$GLB,, | Performed by: FAMILY MEDICINE

## 2020-02-17 PROCEDURE — 99999 PR PBB SHADOW E&M-EST. PATIENT-LVL III: ICD-10-PCS | Mod: PBBFAC,HCNC,, | Performed by: FAMILY MEDICINE

## 2020-02-17 PROCEDURE — 99214 OFFICE O/P EST MOD 30 MIN: CPT | Mod: HCNC,S$GLB,, | Performed by: FAMILY MEDICINE

## 2020-02-17 PROCEDURE — 1101F PR PT FALLS ASSESS DOC 0-1 FALLS W/OUT INJ PAST YR: ICD-10-PCS | Mod: HCNC,CPTII,S$GLB, | Performed by: FAMILY MEDICINE

## 2020-02-17 PROCEDURE — 1126F PR PAIN SEVERITY QUANTIFIED, NO PAIN PRESENT: ICD-10-PCS | Mod: HCNC,S$GLB,, | Performed by: FAMILY MEDICINE

## 2020-02-17 PROCEDURE — 99999 PR PBB SHADOW E&M-EST. PATIENT-LVL III: CPT | Mod: PBBFAC,HCNC,, | Performed by: FAMILY MEDICINE

## 2020-02-17 PROCEDURE — 36415 COLL VENOUS BLD VENIPUNCTURE: CPT | Mod: HCNC,PO

## 2020-02-17 PROCEDURE — 82962 POCT GLUCOSE, HAND-HELD DEVICE: ICD-10-PCS | Mod: HCNC,S$GLB,, | Performed by: FAMILY MEDICINE

## 2020-02-17 PROCEDURE — 80069 RENAL FUNCTION PANEL: CPT | Mod: HCNC

## 2020-02-17 PROCEDURE — 99499 UNLISTED E&M SERVICE: CPT | Mod: S$GLB,,, | Performed by: FAMILY MEDICINE

## 2020-02-17 PROCEDURE — 99499 RISK ADDL DX/OHS AUDIT: ICD-10-PCS | Mod: S$GLB,,, | Performed by: FAMILY MEDICINE

## 2020-02-17 PROCEDURE — 1126F AMNT PAIN NOTED NONE PRSNT: CPT | Mod: HCNC,S$GLB,, | Performed by: FAMILY MEDICINE

## 2020-02-17 PROCEDURE — 83036 HEMOGLOBIN GLYCOSYLATED A1C: CPT | Mod: HCNC

## 2020-02-17 PROCEDURE — 1101F PT FALLS ASSESS-DOCD LE1/YR: CPT | Mod: HCNC,CPTII,S$GLB, | Performed by: FAMILY MEDICINE

## 2020-02-17 PROCEDURE — 1159F MED LIST DOCD IN RCRD: CPT | Mod: HCNC,S$GLB,, | Performed by: FAMILY MEDICINE

## 2020-02-17 NOTE — PROGRESS NOTES
CHIEF COMPLAINT:  This is a 89-year-old female here for follow up hospitalization.     SUBJECTIVE:  The patient is accompanied today by her sitter.  The patient presented to the ED on February 3 because of altered mental status for the past 4 days.  The patient had been gradually declining over the previous few days including decreased oral intake, decreased communication and ambulating, along with generalized weakness and aches.  She had one blood sugar reading in the low 60s that was treated with orange juice.  Chest x-ray showed patchy left lower lobe infiltrate with possible small effusion.  CT scan of the head showed no acute findings.  She was admitted due to generalized weakness and possible pneumonia versus dehydration versus hypoglycemia.  Her diabetic meds were held.  She was started on empiric azithromycin and Rocephin.  She had mild INDIGO with creatinine of 1.26 and BUN 30.  She was started on gentle IV hydration and Lasix was discontinued.  Her cognition improved back to baseline during hospital course.  Her mobility was still limited.  She was started on amlodipine 2.5 mg daily for elevated blood pressure.  She was discharged home to assisted living with home health.  Insulin and glimepiride were discontinued.  Patient also wished to discuss continue several supplements.    Patient has type 2 diabetes and is currently only taking Januvia 50 mg daily.  Her recent blood sugars are in the range of 100-150 fasting and  postprandial.  She denies polyuria, polydipsia or polyphagia.  Last A1c was 7.1% 3 months ago. She takes lisinopril 10 mg daily for hypertension and simvastatin 40 mg daily for hyperlipidemia.  Apparently simvastatin was discontinued after hospitalization.   She has chronic kidney disease stage 3.  She takes Lasix 20 mg twice daily for hypertension and lower extremity edema.  She takes Wellbutrin  mg twice daily for smoking cessation.  She has effectively stop smoking since  November 2017.  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  She has mild bilateral hearing loss.  Patient continues to have intermittent loose stools.  She is taking stool softener twice a day.  She has osteoporosis but refuses treatment.  She is currently undergoing physical therapy at her assisted living facility.  She is apparently gradually getting back to baseline according to her sitter.      ROS:  GENERAL: Patient denies fever, chills, night sweats. Patient denies weight gain or loss. Patient denies anorexia, fatigue, weakness or swollen glands.  SKIN: Patient denies rash or hair loss.  HEENT: Patient denies sore throat, ear pain, hearing loss, nasal congestion, or runny nose. Patient denies visual disturbance, eye irritation or discharge.  LUNGS: Patient denies cough, wheeze or hemoptysis.  CARDIOVASCULAR: Patient denies chest pain, shortness of breath, palpitations, syncope or lower extremity edema.  GI: Patient denies abdominal pain, nausea, vomiting, blood in stool or melena.  Positive for constipation and diarrhea.  GENITOURINARY: Patient denies pelvic pain, vaginal discharge, itch or odor. Patient denies irregular vaginal bleeding. Patient denies dysuria, frequency, hematuria, nocturia, urgency or incontinence.  BREASTS: Patient denies breast pain, mass or nipple discharge.  MUSCULOSKELETAL: Patient denies joint pain, swelling, redness or warmth.  NEUROLOGIC: Patient denies headache, vertigo, paresthesias, weakness in limb, dysarthria, dysphagia or abnormality of gait.  PSYCHIATRIC: Patient denies anxiety, depression, or memory loss.     OBJECTIVE:   GENERAL: Well-developed well-nourished elderly white female alert but dozes off and oriented x3, in no acute distress. Memory, judgment and cognition without deficit.   seated in wheelchair.  Accompanied by sitter.  SKIN: Clear without rash. Normal color and tone.  HEENT: Eyes: Clear conjunctivae. No scleral icterus.  No scleral icterus.  Ears: Clear canals. Clear TMs. Nose: Without congestion. Pharynx: Without injection or exudates.  NECK: Supple, normal range of motion. No masses, lymphadenopathy or enlarged thyroid. No JVD. Carotids 2+ and equal. No bruits.  LUNGS: Clear to auscultation. Normal respiratory effort.  CARDIOVASCULAR: Regular rhythm, normal S1, S2 without murmur, gallop or rub.  ABDOMEN:  Soft, nontender without mass or organomegaly. No rebound or guarding.  EXTREMITIES: Without cyanosis, clubbing or edema. Distal pulses 2+ and equal. Normal range of motion in all extremities. No joint effusion, erythema or warmth.  NEUROLOGIC: Cranial nerves II through XII without deficit. Motor strength equal bilaterally. Sensation normal to touch. Deep tendon reflexes 2+ and equal. Gait unsteady without walker. No tremor.     2 hr postprandial Accu-Chek blood sugar = 168    ASSESSMENT:  1. Confusion    2. Weakness    3. Hypertension associated with diabetes    4. Uncontrolled type 2 diabetes mellitus with hyperglycemia    5. Hyperlipidemia associated with type 2 diabetes mellitus    6. Chronic kidney disease (CKD), stage III (moderate)    7. Exudative age-related macular degeneration, unspecified laterality, unspecified stage      PLAN:  1.  Stay well hydrated.  2.  Continue physical therapy.  3.  Continue to hold insulin and glimepiride.  4.  Monitor blood sugar twice daily and report readings.  5.  Restart simvastatin 40 mg daily.  6. Check A1c.    This note is generated with speech recognition software and is subject to transcription error and sound alike phrases that may be missed by proofreading.

## 2020-02-18 LAB
ALBUMIN SERPL BCP-MCNC: 3.5 G/DL (ref 3.5–5.2)
ANION GAP SERPL CALC-SCNC: 11 MMOL/L (ref 8–16)
BUN SERPL-MCNC: 22 MG/DL (ref 10–30)
CALCIUM SERPL-MCNC: 9.7 MG/DL (ref 8.7–10.5)
CHLORIDE SERPL-SCNC: 106 MMOL/L (ref 95–110)
CO2 SERPL-SCNC: 24 MMOL/L (ref 23–29)
CREAT SERPL-MCNC: 1.2 MG/DL (ref 0.5–1.4)
EST. GFR  (AFRICAN AMERICAN): 45.7 ML/MIN/1.73 M^2
EST. GFR  (NON AFRICAN AMERICAN): 39.6 ML/MIN/1.73 M^2
ESTIMATED AVG GLUCOSE: 146 MG/DL (ref 68–131)
GLUCOSE SERPL-MCNC: 180 MG/DL (ref 70–110)
HBA1C MFR BLD HPLC: 6.7 % (ref 4–5.6)
PHOSPHATE SERPL-MCNC: 3 MG/DL (ref 2.7–4.5)
POTASSIUM SERPL-SCNC: 4.5 MMOL/L (ref 3.5–5.1)
SODIUM SERPL-SCNC: 141 MMOL/L (ref 136–145)

## 2020-02-27 ENCOUNTER — TELEPHONE (OUTPATIENT)
Dept: FAMILY MEDICINE | Facility: CLINIC | Age: 85
End: 2020-02-27

## 2020-02-27 DIAGNOSIS — R53.1 WEAKNESS: Primary | ICD-10-CM

## 2020-02-27 DIAGNOSIS — R54 AGE-RELATED PHYSICAL DEBILITY: ICD-10-CM

## 2020-02-27 NOTE — TELEPHONE ENCOUNTER
----- Message from Nilesh Ramos sent at 2/27/2020  9:34 AM CST -----  Contact: Daughter  Pt would like to get pt order sent to [facility] to do PT with them.          [phone]

## 2020-02-27 NOTE — TELEPHONE ENCOUNTER
Ambulatory referral/consult to Physical/Occupational Therapy faxed to Yandel Marquez at [phone]

## 2020-03-05 ENCOUNTER — TELEPHONE (OUTPATIENT)
Dept: FAMILY MEDICINE | Facility: CLINIC | Age: 85
End: 2020-03-05

## 2020-03-05 NOTE — TELEPHONE ENCOUNTER
----- Message from George Nunes sent at 3/5/2020 11:13 AM CST -----  Contact: [facility]  Type:  Pharmacy Calling to Clarify an RX    Name of Caller: Lorena   Pharmacy Name: [facility] [address]  Prescription Name: amlodopine   What do they need to clarify?: refill request   Best Call Back Number: [phone]  Additional Information: n./a

## 2020-03-06 RX ORDER — AMLODIPINE BESYLATE 2.5 MG/1
2.5 TABLET ORAL DAILY
Qty: 90 TABLET | Refills: 1 | Status: SHIPPED | OUTPATIENT
Start: 2020-03-06 | End: 2020-04-29

## 2020-03-06 NOTE — TELEPHONE ENCOUNTER
----- Message from Carlos Rosado sent at 3/6/2020  2:56 PM CST -----  Contact: [facility]  Type:  Pharmacy Calling to Clarify an RX    Name of Caller:[facility]  [facility]  Prescription Name:norvask-2.5mg  What do they need to clarify?:n/a  Best Call Back Number:[phone]  Additional Information: refill on prescription. Pt is completely out

## 2020-03-06 NOTE — TELEPHONE ENCOUNTER
Patient taken care of in another encounter  ----- Message from Jhoana Joe sent at 3/6/2020  4:15 PM CST -----  Contact: [facility]  Returning a call to nurse about refill for pt Amlodipine. Please call Francesco back at [phone]

## 2020-03-06 NOTE — TELEPHONE ENCOUNTER
----- Message from Katelyn Barnard sent at 3/6/2020  3:04 PM CST -----  Contact: pt pharmacy   .Type:  RX Refill Request    Who Called: martin   Refill or New Rx: refill   RX Name and Strength: amlodipine 2.5 MG   How is the patient currently taking it? (ex. 1XDay): 1 daily   Is this a 30 day or 90 day RX: 90 day   Preferred Pharmacy with phone number:     [facility] [address] [address]  Phone: [phone] Fax: [phone]    Local or Mail Order: mail   Ordering Provider: shelia  Would the patient rather a call back or a response via [facility]? Call   Best Call Back Number:    Additional Information:  They are wanting this today please

## 2020-03-06 NOTE — TELEPHONE ENCOUNTER
Last annual: 11/11/19  Francesco (pharmacist) advised that patient was put on amlodipine 2.5 once daily while in the hospital on 2/5/20, and she's out. They need a refill sent in immediately for the patient.   They don't have lisinopril on her medication list anymore.

## 2020-03-09 ENCOUNTER — TELEPHONE (OUTPATIENT)
Dept: FAMILY MEDICINE | Facility: CLINIC | Age: 85
End: 2020-03-09

## 2020-03-09 NOTE — TELEPHONE ENCOUNTER
----- Message from Jeimy Flor sent at 3/9/2020 10:52 AM CDT -----  Contact: [facility] Lori  .Type:  RX Refill Request    Who Called: Patient  Refill or New Rx: Refill  RX Name and Strength:amLODIPine (NORVASC) 2.5 MG tablet  How is the patient currently taking it? (ex. 1XDay):  Is this a 30 day or 90 day RX:  Preferred Pharmacy with phone number: [facility] [address] [address]  Phone: [phone] Fax: [phone]      Local or Mail Order:Local  Ordering Provider:Dr. Dunlap  Would the patient rather a call back or a response via [facility]?   Best Call Back Number  Additional Information:

## 2020-03-11 ENCOUNTER — PATIENT OUTREACH (OUTPATIENT)
Dept: ADMINISTRATIVE | Facility: OTHER | Age: 85
End: 2020-03-11

## 2020-03-13 RX ORDER — SIMVASTATIN 40 MG/1
TABLET, FILM COATED ORAL
Qty: 30 TABLET | Refills: 5 | Status: SHIPPED | OUTPATIENT
Start: 2020-03-13 | End: 2021-03-08

## 2020-03-23 ENCOUNTER — TELEPHONE (OUTPATIENT)
Dept: FAMILY MEDICINE | Facility: CLINIC | Age: 85
End: 2020-03-23

## 2020-03-23 NOTE — TELEPHONE ENCOUNTER
----- Message from Elizabeth Flores sent at 3/23/2020  8:51 AM CDT -----  Contact: [facility]  Please call Alexia @ [phone] regarding a mobile in house Xray of Pelvis/lower back, pt fell Saturday, please fax to [phone]

## 2020-03-26 ENCOUNTER — TELEPHONE (OUTPATIENT)
Dept: FAMILY MEDICINE | Facility: CLINIC | Age: 85
End: 2020-03-26

## 2020-03-26 NOTE — TELEPHONE ENCOUNTER
vicky asking her to give us a call back regarding request.  ----- Message from Jhoana Joe sent at 3/26/2020 10:31 AM CDT -----  Contact: Snehal-daughter  Snehal requesting a call back regarding pt. She states that pt had a fall and they would like to have pain medication called in for her. Please call Snehal back at [phone].

## 2020-03-26 NOTE — TELEPHONE ENCOUNTER
M for Alexia Turner to give me a call back regarding the patient's xrays.   I asked that they fax them to us [phone], and give us a call if they need to speak with us at [phone]

## 2020-03-27 RX ORDER — HYDROCODONE BITARTRATE AND ACETAMINOPHEN 5; 325 MG/1; MG/1
1-2 TABLET ORAL EVERY 6 HOURS PRN
Qty: 28 TABLET | Refills: 0 | OUTPATIENT
Start: 2020-03-27 | End: 2020-04-06

## 2020-03-27 RX ORDER — HYDROCODONE BITARTRATE AND ACETAMINOPHEN 5; 325 MG/1; MG/1
1-2 TABLET ORAL EVERY 6 HOURS PRN
Qty: 28 TABLET | Refills: 0 | Status: SHIPPED | OUTPATIENT
Start: 2020-03-27 | End: 2020-04-06

## 2020-03-27 NOTE — TELEPHONE ENCOUNTER
Prescription for pain medication faxed to Max Turner at [phone]  ----- Message from Tasia Coleman sent at 3/27/2020  9:29 AM CDT -----  [facility]  [phone]  States that she is calling to speak to Vernique regarding xrays for pt. Please call back at [phone]//thank you acc

## 2020-03-27 NOTE — TELEPHONE ENCOUNTER
Please provide result note for patient's xrays    ----- Message from Vilma Villaseñor sent at 3/27/2020 11:11 AM CDT -----  Contact: Lenny Galvez called and stated that he needs to get the results for the pt's xray. He can be reached at  [phone].    Thanks,  TF

## 2020-03-27 NOTE — TELEPHONE ENCOUNTER
Last annual: 4/9/19  I confirmed with Alexia Turner that xrays have been received via fax, and will be submitted to Dr. Dunlap for review.    Patient's family if medication can be sent to the [facility]- the patient is complaining of extreme pain.  Alexia Turner asked to also fax the medication to her as well at [phone] so they can add it to the patient's MAR      ----- Message from Tasia Coleman sent at 3/27/2020  9:29 AM CDT -----  [facility]  [phone]  States that she is calling to speak to Vernique regarding xrays for pt. Please call back at [phone]//thank you acc

## 2020-03-27 NOTE — TELEPHONE ENCOUNTER
Lenny advised that prescription was sent to the pharmacy for the patient, and I'll give him a call back with xray result note from Dr. Dunlap when received. He verbally verified understanding, and advised that it's ok to leave a message if he doesn't answer.    ----- Message from Vilma Villaseñor sent at 3/27/2020 11:11 AM CDT -----  Contact: Lenny (grandson   Lenny called and stated that he needs to get the results for the pt's xray. He can be reached at  [phone].    Thanks,  TF

## 2020-04-09 RX ORDER — HYDROCODONE BITARTRATE AND ACETAMINOPHEN 5; 325 MG/1; MG/1
1-2 TABLET ORAL EVERY 6 HOURS PRN
Qty: 28 TABLET | Refills: 0 | Status: CANCELLED | OUTPATIENT
Start: 2020-04-09 | End: 2020-04-19

## 2020-04-09 RX ORDER — HYDROCODONE BITARTRATE AND ACETAMINOPHEN 7.5; 325 MG/1; MG/1
1 TABLET ORAL EVERY 8 HOURS PRN
Qty: 28 TABLET | Refills: 0 | Status: SHIPPED | OUTPATIENT
Start: 2020-04-09 | End: 2020-04-19

## 2020-04-09 NOTE — TELEPHONE ENCOUNTER
Cynthia advised:   She can try Tylenol PM for help with sleep.  There are narcotics that are extended release but I do not prescribe such medications.  They are prescribed by pain management doctors for chronic pain.  The only thing I can prescribe is a short acting narcotic like hydrocodone.  If she wants me to increase the dose I can but I am concerned about her getting confused and drowsy and getting up out of bed and falling.  She verbally verified understanding.     She said she's take the advice about Tylenol PM.  She said she understands your concern about her falling, but someone is always there with her at night to prevent that.  The fall she had was during the day when the patient was adamant to do something she wasn't supposed to...  She's just asking that you refill the medication in it's current dose or increased.. She'll go and pick it up and trust your judgement.  [facility]

## 2020-04-09 NOTE — TELEPHONE ENCOUNTER
Last annual: 11/19/19  Cynthia said that Ms. Durbin's MRI appt was missed because she was having some stomach problems/pain; they're in the process of rescheduling it.    She wasn't getting the full dose of the medication which is why she's just running out.  [employer] is the company hired to give medications at the patient's facility, but they leave at 8/9pm. From that time until the next morning, no one is there to give Ms. Durbin the medication.  She's not allowed to take the medication herself.   Cynthia is asking:  **Is there a non-narcotic that can be purchased otc to help her sleep at night during the 12-hour stretch without any medications.   *.. for a refill of the Norco, or something different can be prescribed that might be a little more helpful (last longer).   [facility]    ----- Message from Julia Navarro sent at 4/9/2020  1:53 PM CDT -----  Contact: Patients daughter, cynthia Marie needs to talk to nurse about patients pain medication, please call her back at [phone]. Thank you

## 2020-04-09 NOTE — TELEPHONE ENCOUNTER
She can try Tylenol PM for help with sleep.  There are narcotics that are extended release but I do not prescribe such medications.  They are prescribed by pain management doctors for chronic pain.  The only thing I can prescribe is a short acting narcotic like hydrocodone.  If she wants me to increase the dose I can but I am concerned about her getting confused and drowsy and getting up out of bed and falling.

## 2020-04-16 ENCOUNTER — TELEPHONE (OUTPATIENT)
Dept: FAMILY MEDICINE | Facility: CLINIC | Age: 85
End: 2020-04-16

## 2020-04-16 NOTE — TELEPHONE ENCOUNTER
New orders faxed back to Lori Ortega at [phone] from Dr. Dunlap:   Discontinue Lantus and provide readings for the next 1-2 weeks. MARCK Dunlap MD

## 2020-04-20 ENCOUNTER — TELEPHONE (OUTPATIENT)
Dept: FAMILY MEDICINE | Facility: CLINIC | Age: 85
End: 2020-04-20

## 2020-04-20 RX ORDER — SITAGLIPTIN 50 MG/1
TABLET, FILM COATED ORAL
Qty: 30 TABLET | Refills: 5 | Status: SHIPPED | OUTPATIENT
Start: 2020-04-20 | End: 2020-11-18

## 2020-04-20 NOTE — TELEPHONE ENCOUNTER
Patient has a new compression fracture, and they wanted to make sure that it's ok for her to have medrol dose pack.     Dr. Dunlap informed and advised:   It's ok to put her on medrol dosepack, but she will require daily blood sugar readings reported to her.    Trinh @ Dr. Oracio Valentine's office was advised, and she verbally verified understanding.   ----- Message from Nancie Antunez sent at 4/20/2020  9:44 AM CDT -----  Contact: Trinh whyte/Dr. Oracio Valentine [phone]  Calling to get approval to prescribe medrol dose pack to pt     pls contact Trinh

## 2020-04-29 RX ORDER — AMLODIPINE BESYLATE 2.5 MG/1
TABLET ORAL
Qty: 30 TABLET | Refills: 5 | Status: SHIPPED | OUTPATIENT
Start: 2020-04-29 | End: 2020-11-25

## 2020-05-14 RX ORDER — BUPROPION HYDROCHLORIDE 150 MG/1
TABLET, EXTENDED RELEASE ORAL
Qty: 60 TABLET | Refills: 5 | Status: SHIPPED | OUTPATIENT
Start: 2020-05-14 | End: 2020-12-14

## 2020-05-29 ENCOUNTER — PATIENT OUTREACH (OUTPATIENT)
Dept: ADMINISTRATIVE | Facility: OTHER | Age: 85
End: 2020-05-29

## 2020-06-02 ENCOUNTER — TELEPHONE (OUTPATIENT)
Dept: FAMILY MEDICINE | Facility: CLINIC | Age: 85
End: 2020-06-02

## 2020-06-02 DIAGNOSIS — R54 AGE-RELATED PHYSICAL DEBILITY: Primary | ICD-10-CM

## 2020-06-02 DIAGNOSIS — R53.1 WEAKNESS: ICD-10-CM

## 2020-06-02 NOTE — TELEPHONE ENCOUNTER
Snehal (patient's daughter) is requesting another order for PT&OT for the patient with [facility].  Please enter order

## 2020-06-02 NOTE — TELEPHONE ENCOUNTER
Snehal advised that the patient does have a UTI, and the home has been trying to reach us to get a medication prescribed. I advised that we have no faxes from the home.    Lori @ [facility] ([phone]) can be reached at [phone].   [phone] and [phone] are alternative numbers provided by Snehal    [facility] for Snehal at [phone] asking her to send fax again to me at [phone], and give me a call back.  When I called [phone], I was provided:   Brenda: [phone] and Tasia [phone]    Brenda said she's calling the med [employer] nurse, and will have them fax us again at [phone]    ----- Message from Flower Barba sent at 6/2/2020 12:32 PM CDT -----  Contact: pt daughter /snehal Brian would like a call back in regards to possible UTI and  physical therapy. She can be reached at [phone].       Thanks,  Flower Barba

## 2020-06-03 RX ORDER — CEPHALEXIN 500 MG/1
500 CAPSULE ORAL EVERY 12 HOURS
Qty: 6 CAPSULE | Refills: 0 | Status: SHIPPED | OUTPATIENT
Start: 2020-06-03 | End: 2020-06-06

## 2020-06-03 NOTE — TELEPHONE ENCOUNTER
lvm advising Lori:   RX sent to pharmacy.  Please let them know this is not a true infection.  Seems to have an overgrowth of her own natural skin radha.  She needs to be drinking water, cut back on coffee/tea.  Keep privates clean and dry as much as possible.  Avoid drinking high sugary drinks.  ..and asked her to give me a call to confirm understanding of the message

## 2020-06-03 NOTE — TELEPHONE ENCOUNTER
I advised Snehal that lab orders have been received, and it will be submitted to another provider in the office to call in a prescription for her . She verbally verified understanding.   *Lab results given to VERA Lennon   ----- Message from Jeimy Flor sent at 6/3/2020 10:13 AM CDT -----  Contact: Patient's daughter -Snehaljoel Brito  Please call patient's daughter concerning patient's antibiotics for UTI, they have been waiting over a week. Please call to advise at  [phone]

## 2020-06-03 NOTE — TELEPHONE ENCOUNTER
RX sent to pharmacy.  Please let them know this is not a true infection.  Seems to have an overgrowth of her own natural skin radha.  She needs to be drinking water, cut back on coffee/tea.  Keep privates clean and dry as much as possible.  Avoid drinking high sugary drinks.

## 2020-06-05 ENCOUNTER — TELEPHONE (OUTPATIENT)
Dept: FAMILY MEDICINE | Facility: CLINIC | Age: 85
End: 2020-06-05

## 2020-06-05 NOTE — TELEPHONE ENCOUNTER
Juan at [facility] advised that Ms. Durbin was discharged before for transition to outpatient therapy. They will need another face-to-face visit for the patient within the last 30 days with pcp to provide need for home health.   *Cynthia (daughter) advised, and appt scheduled    ----- Message from Darlin Art sent at 6/5/2020 11:25 AM CDT -----  Contact: [facility]  They are calling in regards to questions about order to re admit pt to home health and is needing other things with order        Pls call back at [phone]

## 2020-06-11 ENCOUNTER — PATIENT OUTREACH (OUTPATIENT)
Dept: ADMINISTRATIVE | Facility: HOSPITAL | Age: 85
End: 2020-06-11

## 2020-06-11 DIAGNOSIS — E11.59 HYPERTENSION ASSOCIATED WITH DIABETES: Primary | Chronic | ICD-10-CM

## 2020-06-11 DIAGNOSIS — I15.2 HYPERTENSION ASSOCIATED WITH DIABETES: Primary | Chronic | ICD-10-CM

## 2020-06-11 NOTE — PROGRESS NOTES
PreVisit Chart Audit Perfomed   Eye Exam Requested from Timmy Zhou   Lipid Panel Ordered           Jerilyn MELVIN LPN Care Coordinator  Care Coordination Department  [facility]  [phone]

## 2020-06-11 NOTE — LETTER
AUTHORIZATION FOR RELEASE OF   CONFIDENTIAL INFORMATION    Dear ,       We are seeing Gwen Durbin, date of birth 10/6/1928, in the clinic at [facility]. Nathalia Dunlap MD is the patient's PCP. Gwen Durbin has an outstanding lab/procedure at the time we reviewed her chart. In order to help keep her health information updated, she has authorized us to request the following medical record(s):        (  )  MAMMOGRAM                                      (  )  COLONOSCOPY      (  )  PAP SMEAR                                          (  )  OUTSIDE LAB RESULTS     (  )  DEXA SCAN                                          (X  )  EYE EXAM            (  )  FOOT EXAM                                          (  )  ENTIRE RECORD     (  )  OUTSIDE IMMUNIZATIONS                 (  )  _______________         Please fax records to [facility], Karen A Muratore, MD, [phone]     If you have any questions, please contact     Jerilyn MELVIN LPN Care Coordinator  Care Coordination Department  [facility]  [phone]           Patient Name: Gwen Durbin  : 10/6/1928  Patient Phone #: [phone]

## 2020-06-12 ENCOUNTER — LAB VISIT (OUTPATIENT)
Dept: LAB | Facility: HOSPITAL | Age: 85
End: 2020-06-12
Attending: FAMILY MEDICINE
Payer: MEDICARE

## 2020-06-12 ENCOUNTER — OFFICE VISIT (OUTPATIENT)
Dept: FAMILY MEDICINE | Facility: CLINIC | Age: 85
End: 2020-06-12
Payer: MEDICARE

## 2020-06-12 VITALS
BODY MASS INDEX: 25.21 KG/M2 | OXYGEN SATURATION: 95 % | WEIGHT: 147.69 LBS | DIASTOLIC BLOOD PRESSURE: 66 MMHG | HEIGHT: 64 IN | TEMPERATURE: 99 F | SYSTOLIC BLOOD PRESSURE: 102 MMHG | RESPIRATION RATE: 18 BRPM | HEART RATE: 77 BPM

## 2020-06-12 DIAGNOSIS — E11.65 UNCONTROLLED TYPE 2 DIABETES MELLITUS WITH HYPERGLYCEMIA: Chronic | ICD-10-CM

## 2020-06-12 DIAGNOSIS — E11.59 HYPERTENSION ASSOCIATED WITH DIABETES: Chronic | ICD-10-CM

## 2020-06-12 DIAGNOSIS — I15.2 HYPERTENSION ASSOCIATED WITH DIABETES: Chronic | ICD-10-CM

## 2020-06-12 DIAGNOSIS — M62.81 DECREASED MUSCLE STRENGTH: Primary | ICD-10-CM

## 2020-06-12 DIAGNOSIS — M79.671 RIGHT FOOT PAIN: ICD-10-CM

## 2020-06-12 DIAGNOSIS — R19.7 DIARRHEA, UNSPECIFIED TYPE: ICD-10-CM

## 2020-06-12 LAB
ALBUMIN SERPL BCP-MCNC: 3.6 G/DL (ref 3.5–5.2)
ALP SERPL-CCNC: 81 U/L (ref 55–135)
ALT SERPL W/O P-5'-P-CCNC: 7 U/L (ref 10–44)
ANION GAP SERPL CALC-SCNC: 13 MMOL/L (ref 8–16)
AST SERPL-CCNC: 13 U/L (ref 10–40)
BASOPHILS # BLD AUTO: 0.09 K/UL (ref 0–0.2)
BASOPHILS NFR BLD: 1.2 % (ref 0–1.9)
BILIRUB SERPL-MCNC: 0.3 MG/DL (ref 0.1–1)
BUN SERPL-MCNC: 11 MG/DL (ref 10–30)
CALCIUM SERPL-MCNC: 9.4 MG/DL (ref 8.7–10.5)
CHLORIDE SERPL-SCNC: 105 MMOL/L (ref 95–110)
CHOLEST SERPL-MCNC: 154 MG/DL (ref 120–199)
CHOLEST/HDLC SERPL: 3.7 {RATIO} (ref 2–5)
CO2 SERPL-SCNC: 23 MMOL/L (ref 23–29)
CREAT SERPL-MCNC: 1.2 MG/DL (ref 0.5–1.4)
DIFFERENTIAL METHOD: ABNORMAL
EOSINOPHIL # BLD AUTO: 0.4 K/UL (ref 0–0.5)
EOSINOPHIL NFR BLD: 5.1 % (ref 0–8)
ERYTHROCYTE [DISTWIDTH] IN BLOOD BY AUTOMATED COUNT: 13.4 % (ref 11.5–14.5)
EST. GFR  (AFRICAN AMERICAN): 45.7 ML/MIN/1.73 M^2
EST. GFR  (NON AFRICAN AMERICAN): 39.6 ML/MIN/1.73 M^2
GLUCOSE SERPL-MCNC: 190 MG/DL (ref 70–110)
GLUCOSE SERPL-MCNC: 207 MG/DL (ref 70–110)
HCT VFR BLD AUTO: 40.8 % (ref 37–48.5)
HDLC SERPL-MCNC: 42 MG/DL (ref 40–75)
HDLC SERPL: 27.3 % (ref 20–50)
HGB BLD-MCNC: 13 G/DL (ref 12–16)
IMM GRANULOCYTES # BLD AUTO: 0.05 K/UL (ref 0–0.04)
IMM GRANULOCYTES NFR BLD AUTO: 0.7 % (ref 0–0.5)
LDLC SERPL CALC-MCNC: 73.2 MG/DL (ref 63–159)
LYMPHOCYTES # BLD AUTO: 1.1 K/UL (ref 1–4.8)
LYMPHOCYTES NFR BLD: 15.6 % (ref 18–48)
MCH RBC QN AUTO: 32.5 PG (ref 27–31)
MCHC RBC AUTO-ENTMCNC: 31.9 G/DL (ref 32–36)
MCV RBC AUTO: 102 FL (ref 82–98)
MONOCYTES # BLD AUTO: 0.7 K/UL (ref 0.3–1)
MONOCYTES NFR BLD: 9.4 % (ref 4–15)
NEUTROPHILS # BLD AUTO: 5 K/UL (ref 1.8–7.7)
NEUTROPHILS NFR BLD: 68 % (ref 38–73)
NONHDLC SERPL-MCNC: 112 MG/DL
NRBC BLD-RTO: 0 /100 WBC
PLATELET # BLD AUTO: 333 K/UL (ref 150–350)
PMV BLD AUTO: 11.1 FL (ref 9.2–12.9)
POTASSIUM SERPL-SCNC: 4.1 MMOL/L (ref 3.5–5.1)
PROT SERPL-MCNC: 6.9 G/DL (ref 6–8.4)
RBC # BLD AUTO: 4 M/UL (ref 4–5.4)
SODIUM SERPL-SCNC: 141 MMOL/L (ref 136–145)
TRIGL SERPL-MCNC: 194 MG/DL (ref 30–150)
WBC # BLD AUTO: 7.32 K/UL (ref 3.9–12.7)

## 2020-06-12 PROCEDURE — 1126F AMNT PAIN NOTED NONE PRSNT: CPT | Mod: HCNC,S$GLB,, | Performed by: FAMILY MEDICINE

## 2020-06-12 PROCEDURE — 99499 UNLISTED E&M SERVICE: CPT | Mod: S$GLB,,, | Performed by: FAMILY MEDICINE

## 2020-06-12 PROCEDURE — 82962 POCT GLUCOSE, HAND-HELD DEVICE: ICD-10-PCS | Mod: HCNC,S$GLB,, | Performed by: FAMILY MEDICINE

## 2020-06-12 PROCEDURE — 1101F PR PT FALLS ASSESS DOC 0-1 FALLS W/OUT INJ PAST YR: ICD-10-PCS | Mod: HCNC,CPTII,S$GLB, | Performed by: FAMILY MEDICINE

## 2020-06-12 PROCEDURE — 85025 COMPLETE CBC W/AUTO DIFF WBC: CPT | Mod: HCNC

## 2020-06-12 PROCEDURE — 82962 GLUCOSE BLOOD TEST: CPT | Mod: HCNC,S$GLB,, | Performed by: FAMILY MEDICINE

## 2020-06-12 PROCEDURE — 1159F MED LIST DOCD IN RCRD: CPT | Mod: HCNC,S$GLB,, | Performed by: FAMILY MEDICINE

## 2020-06-12 PROCEDURE — 36415 COLL VENOUS BLD VENIPUNCTURE: CPT | Mod: HCNC,PO

## 2020-06-12 PROCEDURE — 1159F PR MEDICATION LIST DOCUMENTED IN MEDICAL RECORD: ICD-10-PCS | Mod: HCNC,S$GLB,, | Performed by: FAMILY MEDICINE

## 2020-06-12 PROCEDURE — 99214 PR OFFICE/OUTPT VISIT, EST, LEVL IV, 30-39 MIN: ICD-10-PCS | Mod: HCNC,S$GLB,, | Performed by: FAMILY MEDICINE

## 2020-06-12 PROCEDURE — 83036 HEMOGLOBIN GLYCOSYLATED A1C: CPT | Mod: HCNC

## 2020-06-12 PROCEDURE — 99499 RISK ADDL DX/OHS AUDIT: ICD-10-PCS | Mod: S$GLB,,, | Performed by: FAMILY MEDICINE

## 2020-06-12 PROCEDURE — 80053 COMPREHEN METABOLIC PANEL: CPT | Mod: HCNC

## 2020-06-12 PROCEDURE — 1126F PR PAIN SEVERITY QUANTIFIED, NO PAIN PRESENT: ICD-10-PCS | Mod: HCNC,S$GLB,, | Performed by: FAMILY MEDICINE

## 2020-06-12 PROCEDURE — 80061 LIPID PANEL: CPT | Mod: HCNC

## 2020-06-12 PROCEDURE — 99999 PR PBB SHADOW E&M-EST. PATIENT-LVL III: ICD-10-PCS | Mod: PBBFAC,HCNC,, | Performed by: FAMILY MEDICINE

## 2020-06-12 PROCEDURE — 99999 PR PBB SHADOW E&M-EST. PATIENT-LVL III: CPT | Mod: PBBFAC,HCNC,, | Performed by: FAMILY MEDICINE

## 2020-06-12 PROCEDURE — 99214 OFFICE O/P EST MOD 30 MIN: CPT | Mod: HCNC,S$GLB,, | Performed by: FAMILY MEDICINE

## 2020-06-12 PROCEDURE — 1101F PT FALLS ASSESS-DOCD LE1/YR: CPT | Mod: HCNC,CPTII,S$GLB, | Performed by: FAMILY MEDICINE

## 2020-06-12 NOTE — PROGRESS NOTES
CHIEF COMPLAINT:  This is a 89-year-old female here for follow up chronic medical conditions.    SUBJECTIVE:  The patient is accompanied today by her sitter. She complains of nausea and diarrhea since yesterday. The patient would like physical therapy to increase strength in hands and legs. She complains of pain in right heel and big toe at times.  Patient has type 2 diabetes and is currently taking Januvia 50 mg daily and Lantus 21 units at night.  Her recent blood sugars are in the range of 150-180 fasting and 200-300 postprandial.  She denies polyuria, polydipsia or polyphagia.  Last A1c was  6.7% 3 months ago. She takes lisinopril 10 mg daily for hypertension and simvastatin 40 mg daily for hyperlipidemia.  Apparently simvastatin was discontinued after hospitalization.   She has chronic kidney disease stage 3.  She takes Lasix 20 mg twice daily for hypertension and lower extremity edema.  She takes Wellbutrin  mg twice daily for smoking cessation.  She has effectively stopped smoking since November 2017.  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  She has mild bilateral hearing loss.  She has osteoporosis but refuses treatment.          ROS:  GENERAL: Patient denies fever, chills, night sweats. Patient denies weight gain or loss. Patient denies anorexia, fatigue, weakness or swollen glands.  SKIN: Patient denies rash or hair loss.  HEENT: Patient denies sore throat, ear pain, hearing loss, nasal congestion, or runny nose. Patient denies visual disturbance, eye irritation or discharge.  LUNGS: Patient denies cough, wheeze or hemoptysis.  CARDIOVASCULAR: Patient denies chest pain, shortness of breath, palpitations, syncope or lower extremity edema.  GI: Patient denies abdominal pain, nausea, vomiting, blood in stool or melena.  Positive for constipation and diarrhea.  GENITOURINARY: Patient denies pelvic pain, vaginal discharge, itch or odor. Patient denies irregular vaginal bleeding.  Patient denies dysuria, frequency, hematuria, nocturia, urgency or incontinence.  BREASTS: Patient denies breast pain, mass or nipple discharge.  MUSCULOSKELETAL: Patient denies joint pain, swelling, redness or warmth.  NEUROLOGIC: Patient denies headache, vertigo, paresthesias, weakness in limb, dysarthria, dysphagia or abnormality of gait.  PSYCHIATRIC: Patient denies anxiety, depression, or memory loss.     OBJECTIVE:   GENERAL: Well-developed well-nourished elderly white female alert but dozes off and oriented x3, in no acute distress. Memory, judgment and cognition without deficit.  Seated in wheelchair.   lethargic.  Accompanied by sitter.  SKIN: Clear without rash.  Skin pallor.  HEENT: Eyes: Clear conjunctivae. No scleral icterus.  No scleral icterus. Ears: Clear canals. Clear TMs. Nose: Without congestion. Pharynx: Without injection or exudates.  NECK: Supple, normal range of motion. No masses, lymphadenopathy or enlarged thyroid. No JVD. Carotids 2+ and equal. No bruits.  LUNGS: Clear to auscultation. Normal respiratory effort.  CARDIOVASCULAR: Regular rhythm, normal S1, S2 without murmur, gallop or rub.  ABDOMEN:  Soft, nontender without mass or organomegaly. No rebound or guarding.  EXTREMITIES: Without cyanosis, clubbing or edema. Distal pulses 2+ and equal. Normal range of motion in all extremities. No joint effusion, erythema or warmth.  NEUROLOGIC: Cranial nerves II through XII without deficit. Motor strength equal bilaterally. Sensation normal to touch. Deep tendon reflexes 2+ and equal. Gait unsteady without walker. No tremor.   Protective Sensation (w/ 10 gram monofilament):  Right: Decreased  Left: Decreased    Visual Inspection:  Dry skin, onychomycosis and callus bilaterally    Pedal Pulses:   Right: Present  Left: Present    Posterior tibialis:   Right:Present  Left: Present     Random BS = 190.    ASSESSMENT:  1. Decreased muscle strength    2. Right foot pain    3. Diarrhea, unspecified type     4. Uncontrolled type 2 diabetes mellitus with hyperglycemia      PLAN:  1.  Physical therapy.  2.  Check CBC, CMP and A1c.  3.  Increased dose of Lantus as needed.  4.  Monitor nausea and diarrhea and reports no resolution or worsening symptoms.  5.  Follow-up in 3 months.    This note is generated with speech recognition software and is subject to transcription error and sound alike phrases that may be missed by proofreading.

## 2020-06-13 LAB
ESTIMATED AVG GLUCOSE: 146 MG/DL (ref 68–131)
HBA1C MFR BLD HPLC: 6.7 % (ref 4–5.6)
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
## 2020-08-25 ENCOUNTER — TELEPHONE (OUTPATIENT)
Dept: FAMILY MEDICINE | Facility: CLINIC | Age: 85
End: 2020-08-25

## 2020-08-25 DIAGNOSIS — E11.65 UNCONTROLLED TYPE 2 DIABETES MELLITUS WITH HYPERGLYCEMIA: Primary | Chronic | ICD-10-CM

## 2020-08-25 RX ORDER — CLOTRIMAZOLE AND BETAMETHASONE DIPROPIONATE 10; .64 MG/G; MG/G
CREAM TOPICAL 2 TIMES DAILY
Qty: 1 TUBE | Refills: 0 | Status: SHIPPED | OUTPATIENT
Start: 2020-08-25 | End: 2020-09-04

## 2020-08-25 NOTE — TELEPHONE ENCOUNTER
[facility] med nurse states pt has Yeast rash on left breast. Requesting cream. Blood sugar been running high every day.

## 2020-08-25 NOTE — TELEPHONE ENCOUNTER
Prescription sent to pharmacy for cream.      Is someone able to give her insulin injections with her meals?  Why do they think that her blood sugars are running higher?  Has her diet changed?

## 2020-08-25 NOTE — TELEPHONE ENCOUNTER
----- Message from Belinda Gallegos sent at 8/25/2020 10:47 AM CDT -----  Regarding: med advice  .Type:  Needs Medical Advice        [facility] [address] [address]  Phone: [phone] Fax: [phone]        Would the patient rather a call back or a response via [facility]? Call back   Best Call Back Number:  [phone]   Additional Information: pt needing cream no fever

## 2020-08-26 RX ORDER — INSULIN ASPART 100 [IU]/ML
5 INJECTION, SOLUTION INTRAVENOUS; SUBCUTANEOUS
Qty: 1 BOX | Refills: 5 | Status: SHIPPED | OUTPATIENT
Start: 2020-08-26 | End: 2021-03-16

## 2020-08-26 NOTE — TELEPHONE ENCOUNTER
Called [facility] and spoke to Lori about medication changes per Dr. Rockwell request Lori stated understanding.

## 2020-08-26 NOTE — TELEPHONE ENCOUNTER
1.  Increase Lantus to 25 units daily.  2.  Start NovoLog 5 units with each meal.  Prescription sent to pharmacy.  3.  Monitor blood sugar and report readings in 1-2 weeks.

## 2020-08-26 NOTE — TELEPHONE ENCOUNTER
[facility] states that someone is available to give insulin inj. States that there has not been a change in her diet and that there are no contributing factors that are causing increased glucose levels. Informed pt of cream being sent to the pharmacy.//ez

## 2020-09-04 RX ORDER — CLOTRIMAZOLE AND BETAMETHASONE DIPROPIONATE 10; .64 MG/G; MG/G
CREAM TOPICAL
Qty: 15 G | Refills: 5 | Status: SHIPPED | OUTPATIENT
Start: 2020-09-04

## 2020-10-28 ENCOUNTER — TELEPHONE (OUTPATIENT)
Dept: FAMILY MEDICINE | Facility: CLINIC | Age: 85
End: 2020-10-28

## 2020-10-28 NOTE — TELEPHONE ENCOUNTER
I don't know what cream they are referring to and how do I know if they even make a powder.  They need to be specific, including what is being treated.

## 2020-10-28 NOTE — TELEPHONE ENCOUNTER
----- Message from Jhoana Heath sent at 10/28/2020 12:35 PM CDT -----  Contact: Leeann Sandoval from [facility] requesting a call back regarding pt and cream that was ordered. She states that they would like to switch to the powder instead. Please call Lori back at [phone].          [facility] [address] [address]  Phone: [phone] Fax: [phone]

## 2020-10-28 NOTE — TELEPHONE ENCOUNTER
----- Message from Jhoana Heath sent at 10/28/2020 12:35 PM CDT -----  Contact: Leeann Sandoval from [facility] requesting a call back regarding pt and cream that was ordered. She states that they would like to switch to the powder instead. Please call Lori back at [phone].          [facility] [address] [address]  Phone: [phone] Fax: [phone]

## 2020-10-28 NOTE — TELEPHONE ENCOUNTER
Called [facility] no answer unable to LVM      Its look like [facility] wants to swap the cream that was order for the pt would like to switch it to the poweder.      Please advise.

## 2020-10-30 ENCOUNTER — TELEPHONE (OUTPATIENT)
Dept: FAMILY MEDICINE | Facility: CLINIC | Age: 85
End: 2020-10-30

## 2020-10-30 RX ORDER — NYSTATIN 100000 [USP'U]/G
POWDER TOPICAL 4 TIMES DAILY
Qty: 1 BOTTLE | Refills: 5 | Status: SHIPPED | OUTPATIENT
Start: 2020-10-30 | End: 2021-06-18

## 2020-10-30 NOTE — TELEPHONE ENCOUNTER
Nystatin powder sent to pharmacy.  I am not going to treat a nail fungus without seeing and evaluating the patient because this requires oral medication which can affect the liver.

## 2020-10-30 NOTE — TELEPHONE ENCOUNTER
----- Message from Oly Robb sent at 10/30/2020 11:17 AM CDT -----  Contact: Lori [facility]/[phone]  Type:  Needs Medical Advice    Who Called: Lori/[facility]    Symptoms (please be specific):  Fungal Infection Under Breast and Nails  How long has patient had these symptoms:    Pharmacy name and phone #:    [facility] [address] [address]  Phone: [phone] Fax: [phone]      Would the patient rather a call back or a response via [facility]? Call Back  Best Call Back Number: [phone]  Additional Information: Lori called and would like to switch to the Lotrisone Powder for treatment under her breast. Patient also has a fungal infection under her fingernails as well/ Lori would like a response and or call in prescription for medication/Powder.        Thanks/BETSY

## 2020-11-18 RX ORDER — SITAGLIPTIN 50 MG/1
TABLET, FILM COATED ORAL
Qty: 30 TABLET | Refills: 5 | Status: SHIPPED | OUTPATIENT
Start: 2020-11-18 | End: 2021-06-25

## 2020-11-25 RX ORDER — AMLODIPINE BESYLATE 2.5 MG/1
TABLET ORAL
Qty: 30 TABLET | Refills: 5 | Status: SHIPPED | OUTPATIENT
Start: 2020-11-25 | End: 2021-06-21

## 2020-12-14 RX ORDER — BUPROPION HYDROCHLORIDE 150 MG/1
TABLET, EXTENDED RELEASE ORAL
Qty: 60 TABLET | Refills: 5 | Status: SHIPPED | OUTPATIENT
Start: 2020-12-14 | End: 2021-06-30

## 2021-01-28 ENCOUNTER — PES CALL (OUTPATIENT)
Dept: ADMINISTRATIVE | Facility: CLINIC | Age: 86
End: 2021-01-28

## 2021-02-05 ENCOUNTER — TELEPHONE (OUTPATIENT)
Dept: FAMILY MEDICINE | Facility: CLINIC | Age: 86
End: 2021-02-05

## 2021-02-05 DIAGNOSIS — L89.309 PRESSURE INJURY OF SKIN OF BUTTOCK, UNSPECIFIED INJURY STAGE, UNSPECIFIED LATERALITY: Primary | ICD-10-CM

## 2021-02-05 DIAGNOSIS — N18.30 STAGE 3 CHRONIC KIDNEY DISEASE, UNSPECIFIED WHETHER STAGE 3A OR 3B CKD: ICD-10-CM

## 2021-02-05 DIAGNOSIS — E11.65 UNCONTROLLED TYPE 2 DIABETES MELLITUS WITH HYPERGLYCEMIA: ICD-10-CM

## 2021-02-08 ENCOUNTER — TELEPHONE (OUTPATIENT)
Dept: FAMILY MEDICINE | Facility: CLINIC | Age: 86
End: 2021-02-08

## 2021-02-11 RX ORDER — PEN NEEDLE, DIABETIC 31 GX5/16"
NEEDLE, DISPOSABLE MISCELLANEOUS
Qty: 100 EACH | Refills: 3 | Status: SHIPPED | OUTPATIENT
Start: 2021-02-11

## 2021-02-12 ENCOUNTER — TELEPHONE (OUTPATIENT)
Dept: FAMILY MEDICINE | Facility: CLINIC | Age: 86
End: 2021-02-12

## 2021-02-19 ENCOUNTER — PES CALL (OUTPATIENT)
Dept: ADMINISTRATIVE | Facility: CLINIC | Age: 86
End: 2021-02-19

## 2021-03-01 ENCOUNTER — TELEPHONE (OUTPATIENT)
Dept: FAMILY MEDICINE | Facility: CLINIC | Age: 86
End: 2021-03-01

## 2021-03-08 RX ORDER — SIMVASTATIN 40 MG/1
TABLET, FILM COATED ORAL
Qty: 30 TABLET | Refills: 3 | Status: SHIPPED | OUTPATIENT
Start: 2021-03-08 | End: 2021-07-26

## 2021-03-09 ENCOUNTER — OFFICE VISIT (OUTPATIENT)
Dept: FAMILY MEDICINE | Facility: CLINIC | Age: 86
End: 2021-03-09
Payer: MEDICARE

## 2021-03-09 VITALS
RESPIRATION RATE: 20 BRPM | TEMPERATURE: 98 F | WEIGHT: 147.69 LBS | DIASTOLIC BLOOD PRESSURE: 68 MMHG | HEIGHT: 64 IN | HEART RATE: 66 BPM | BODY MASS INDEX: 25.21 KG/M2 | SYSTOLIC BLOOD PRESSURE: 102 MMHG | OXYGEN SATURATION: 97 %

## 2021-03-09 VITALS
SYSTOLIC BLOOD PRESSURE: 102 MMHG | TEMPERATURE: 98 F | RESPIRATION RATE: 18 BRPM | HEIGHT: 64 IN | HEART RATE: 66 BPM | BODY MASS INDEX: 25.35 KG/M2 | OXYGEN SATURATION: 97 % | DIASTOLIC BLOOD PRESSURE: 68 MMHG

## 2021-03-09 DIAGNOSIS — E11.59 HYPERTENSION ASSOCIATED WITH DIABETES: Chronic | ICD-10-CM

## 2021-03-09 DIAGNOSIS — E11.69 HYPERLIPIDEMIA ASSOCIATED WITH TYPE 2 DIABETES MELLITUS: Chronic | ICD-10-CM

## 2021-03-09 DIAGNOSIS — E11.65 UNCONTROLLED TYPE 2 DIABETES MELLITUS WITH HYPERGLYCEMIA: Chronic | ICD-10-CM

## 2021-03-09 DIAGNOSIS — N18.30 STAGE 3 CHRONIC KIDNEY DISEASE, UNSPECIFIED WHETHER STAGE 3A OR 3B CKD: ICD-10-CM

## 2021-03-09 DIAGNOSIS — E78.5 HYPERLIPIDEMIA ASSOCIATED WITH TYPE 2 DIABETES MELLITUS: Chronic | ICD-10-CM

## 2021-03-09 DIAGNOSIS — I35.1 AORTIC VALVE INSUFFICIENCY, ETIOLOGY OF CARDIAC VALVE DISEASE UNSPECIFIED: ICD-10-CM

## 2021-03-09 DIAGNOSIS — Z00.00 ENCOUNTER FOR PREVENTIVE HEALTH EXAMINATION: Primary | ICD-10-CM

## 2021-03-09 DIAGNOSIS — R26.81 GAIT INSTABILITY: ICD-10-CM

## 2021-03-09 DIAGNOSIS — I15.2 HYPERTENSION ASSOCIATED WITH DIABETES: Chronic | ICD-10-CM

## 2021-03-09 DIAGNOSIS — L60.0 INGROWING NAIL: ICD-10-CM

## 2021-03-09 DIAGNOSIS — M80.00XD AGE-RELATED OSTEOPOROSIS WITH CURRENT PATHOLOGICAL FRACTURE WITH ROUTINE HEALING: ICD-10-CM

## 2021-03-09 DIAGNOSIS — E55.9 VITAMIN D DEFICIENCY: ICD-10-CM

## 2021-03-09 DIAGNOSIS — M15.9 PRIMARY OSTEOARTHRITIS INVOLVING MULTIPLE JOINTS: ICD-10-CM

## 2021-03-09 DIAGNOSIS — B35.1 ONYCHOMYCOSIS: Primary | ICD-10-CM

## 2021-03-09 DIAGNOSIS — N18.32 TYPE 2 DIABETES MELLITUS WITH STAGE 3B CHRONIC KIDNEY DISEASE, WITHOUT LONG-TERM CURRENT USE OF INSULIN: ICD-10-CM

## 2021-03-09 DIAGNOSIS — H35.3210 EXUDATIVE AGE-RELATED MACULAR DEGENERATION OF RIGHT EYE, UNSPECIFIED STAGE: ICD-10-CM

## 2021-03-09 DIAGNOSIS — E11.22 TYPE 2 DIABETES MELLITUS WITH STAGE 3B CHRONIC KIDNEY DISEASE, WITHOUT LONG-TERM CURRENT USE OF INSULIN: ICD-10-CM

## 2021-03-09 PROCEDURE — 99499 RISK ADDL DX/OHS AUDIT: ICD-10-PCS | Mod: S$GLB,,, | Performed by: NURSE PRACTITIONER

## 2021-03-09 PROCEDURE — 1101F PT FALLS ASSESS-DOCD LE1/YR: CPT | Mod: CPTII,S$GLB,, | Performed by: NURSE PRACTITIONER

## 2021-03-09 PROCEDURE — G0439 PPPS, SUBSEQ VISIT: HCPCS | Mod: S$GLB,,, | Performed by: NURSE PRACTITIONER

## 2021-03-09 PROCEDURE — 3288F PR FALLS RISK ASSESSMENT DOCUMENTED: ICD-10-PCS | Mod: CPTII,S$GLB,, | Performed by: NURSE PRACTITIONER

## 2021-03-09 PROCEDURE — 99999 PR PBB SHADOW E&M-EST. PATIENT-LVL III: ICD-10-PCS | Mod: PBBFAC,,, | Performed by: REGISTERED NURSE

## 2021-03-09 PROCEDURE — 1101F PT FALLS ASSESS-DOCD LE1/YR: CPT | Mod: CPTII,S$GLB,, | Performed by: REGISTERED NURSE

## 2021-03-09 PROCEDURE — 1101F PR PT FALLS ASSESS DOC 0-1 FALLS W/OUT INJ PAST YR: ICD-10-PCS | Mod: CPTII,S$GLB,, | Performed by: REGISTERED NURSE

## 2021-03-09 PROCEDURE — 1125F AMNT PAIN NOTED PAIN PRSNT: CPT | Mod: S$GLB,,, | Performed by: REGISTERED NURSE

## 2021-03-09 PROCEDURE — 3288F PR FALLS RISK ASSESSMENT DOCUMENTED: ICD-10-PCS | Mod: CPTII,S$GLB,, | Performed by: REGISTERED NURSE

## 2021-03-09 PROCEDURE — 3288F FALL RISK ASSESSMENT DOCD: CPT | Mod: CPTII,S$GLB,, | Performed by: REGISTERED NURSE

## 2021-03-09 PROCEDURE — 1125F PR PAIN SEVERITY QUANTIFIED, PAIN PRESENT: ICD-10-PCS | Mod: S$GLB,,, | Performed by: REGISTERED NURSE

## 2021-03-09 PROCEDURE — 1126F PR PAIN SEVERITY QUANTIFIED, NO PAIN PRESENT: ICD-10-PCS | Mod: S$GLB,,, | Performed by: NURSE PRACTITIONER

## 2021-03-09 PROCEDURE — 99213 OFFICE O/P EST LOW 20 MIN: CPT | Mod: S$GLB,,, | Performed by: REGISTERED NURSE

## 2021-03-09 PROCEDURE — 1126F AMNT PAIN NOTED NONE PRSNT: CPT | Mod: S$GLB,,, | Performed by: NURSE PRACTITIONER

## 2021-03-09 PROCEDURE — 99999 PR PBB SHADOW E&M-EST. PATIENT-LVL III: ICD-10-PCS | Mod: PBBFAC,,, | Performed by: NURSE PRACTITIONER

## 2021-03-09 PROCEDURE — G0439 PR MEDICARE ANNUAL WELLNESS SUBSEQUENT VISIT: ICD-10-PCS | Mod: S$GLB,,, | Performed by: NURSE PRACTITIONER

## 2021-03-09 PROCEDURE — 3288F FALL RISK ASSESSMENT DOCD: CPT | Mod: CPTII,S$GLB,, | Performed by: NURSE PRACTITIONER

## 2021-03-09 PROCEDURE — 99999 PR PBB SHADOW E&M-EST. PATIENT-LVL III: CPT | Mod: PBBFAC,,, | Performed by: REGISTERED NURSE

## 2021-03-09 PROCEDURE — 99499 UNLISTED E&M SERVICE: CPT | Mod: S$GLB,,, | Performed by: NURSE PRACTITIONER

## 2021-03-09 PROCEDURE — 1101F PR PT FALLS ASSESS DOC 0-1 FALLS W/OUT INJ PAST YR: ICD-10-PCS | Mod: CPTII,S$GLB,, | Performed by: NURSE PRACTITIONER

## 2021-03-09 PROCEDURE — 1159F PR MEDICATION LIST DOCUMENTED IN MEDICAL RECORD: ICD-10-PCS | Mod: S$GLB,,, | Performed by: REGISTERED NURSE

## 2021-03-09 PROCEDURE — 99213 PR OFFICE/OUTPT VISIT, EST, LEVL III, 20-29 MIN: ICD-10-PCS | Mod: S$GLB,,, | Performed by: REGISTERED NURSE

## 2021-03-09 PROCEDURE — 1159F MED LIST DOCD IN RCRD: CPT | Mod: S$GLB,,, | Performed by: REGISTERED NURSE

## 2021-03-09 PROCEDURE — 99999 PR PBB SHADOW E&M-EST. PATIENT-LVL III: CPT | Mod: PBBFAC,,, | Performed by: NURSE PRACTITIONER

## 2021-03-16 ENCOUNTER — OFFICE VISIT (OUTPATIENT)
Dept: PODIATRY | Facility: CLINIC | Age: 86
End: 2021-03-16
Payer: MEDICARE

## 2021-03-16 VITALS — HEIGHT: 64 IN | BODY MASS INDEX: 25.21 KG/M2 | WEIGHT: 147.69 LBS

## 2021-03-16 DIAGNOSIS — E11.9 COMPREHENSIVE DIABETIC FOOT EXAMINATION, TYPE 2 DM, ENCOUNTER FOR: Primary | ICD-10-CM

## 2021-03-16 DIAGNOSIS — E11.51 TYPE 2 DIABETES MELLITUS WITH DIABETIC PERIPHERAL ANGIOPATHY WITHOUT GANGRENE, WITH LONG-TERM CURRENT USE OF INSULIN: ICD-10-CM

## 2021-03-16 DIAGNOSIS — B35.1 ONYCHOMYCOSIS: ICD-10-CM

## 2021-03-16 DIAGNOSIS — E11.65 UNCONTROLLED TYPE 2 DIABETES MELLITUS WITH HYPERGLYCEMIA: Chronic | ICD-10-CM

## 2021-03-16 DIAGNOSIS — Z79.4 TYPE 2 DIABETES MELLITUS WITH DIABETIC PERIPHERAL ANGIOPATHY WITHOUT GANGRENE, WITH LONG-TERM CURRENT USE OF INSULIN: ICD-10-CM

## 2021-03-16 DIAGNOSIS — N18.31 STAGE 3A CHRONIC KIDNEY DISEASE: ICD-10-CM

## 2021-03-16 PROCEDURE — 99999 PR PBB SHADOW E&M-EST. PATIENT-LVL III: ICD-10-PCS | Mod: PBBFAC,,, | Performed by: PODIATRIST

## 2021-03-16 PROCEDURE — 99499 RISK ADDL DX/OHS AUDIT: ICD-10-PCS | Mod: S$GLB,,, | Performed by: PODIATRIST

## 2021-03-16 PROCEDURE — 99203 PR OFFICE/OUTPT VISIT, NEW, LEVL III, 30-44 MIN: ICD-10-PCS | Mod: 25,S$GLB,, | Performed by: PODIATRIST

## 2021-03-16 PROCEDURE — 11721 DEBRIDE NAIL 6 OR MORE: CPT | Mod: Q8,S$GLB,, | Performed by: PODIATRIST

## 2021-03-16 PROCEDURE — 99203 OFFICE O/P NEW LOW 30 MIN: CPT | Mod: 25,S$GLB,, | Performed by: PODIATRIST

## 2021-03-16 PROCEDURE — 3288F FALL RISK ASSESSMENT DOCD: CPT | Mod: CPTII,S$GLB,, | Performed by: PODIATRIST

## 2021-03-16 PROCEDURE — 99999 PR PBB SHADOW E&M-EST. PATIENT-LVL III: CPT | Mod: PBBFAC,,, | Performed by: PODIATRIST

## 2021-03-16 PROCEDURE — 99499 UNLISTED E&M SERVICE: CPT | Mod: S$GLB,,, | Performed by: PODIATRIST

## 2021-03-16 PROCEDURE — 1159F PR MEDICATION LIST DOCUMENTED IN MEDICAL RECORD: ICD-10-PCS | Mod: S$GLB,,, | Performed by: PODIATRIST

## 2021-03-16 PROCEDURE — 1126F PR PAIN SEVERITY QUANTIFIED, NO PAIN PRESENT: ICD-10-PCS | Mod: S$GLB,,, | Performed by: PODIATRIST

## 2021-03-16 PROCEDURE — 3288F PR FALLS RISK ASSESSMENT DOCUMENTED: ICD-10-PCS | Mod: CPTII,S$GLB,, | Performed by: PODIATRIST

## 2021-03-16 PROCEDURE — 1159F MED LIST DOCD IN RCRD: CPT | Mod: S$GLB,,, | Performed by: PODIATRIST

## 2021-03-16 PROCEDURE — 11721 PR DEBRIDEMENT OF NAILS, 6 OR MORE: ICD-10-PCS | Mod: Q8,S$GLB,, | Performed by: PODIATRIST

## 2021-03-16 PROCEDURE — 1101F PR PT FALLS ASSESS DOC 0-1 FALLS W/OUT INJ PAST YR: ICD-10-PCS | Mod: CPTII,S$GLB,, | Performed by: PODIATRIST

## 2021-03-16 PROCEDURE — 1101F PT FALLS ASSESS-DOCD LE1/YR: CPT | Mod: CPTII,S$GLB,, | Performed by: PODIATRIST

## 2021-03-16 PROCEDURE — 1126F AMNT PAIN NOTED NONE PRSNT: CPT | Mod: S$GLB,,, | Performed by: PODIATRIST

## 2021-03-16 RX ORDER — INSULIN ASPART 100 [IU]/ML
INJECTION, SOLUTION INTRAVENOUS; SUBCUTANEOUS
Qty: 6 ML | Refills: 2 | Status: SHIPPED | OUTPATIENT
Start: 2021-03-16 | End: 2021-08-11

## 2021-03-25 RX ORDER — INSULIN GLARGINE 100 [IU]/ML
25 INJECTION, SOLUTION SUBCUTANEOUS NIGHTLY
Qty: 6 ML | Refills: 3 | Status: SHIPPED | OUTPATIENT
Start: 2021-03-25 | End: 2021-06-15

## 2021-04-08 ENCOUNTER — TELEPHONE (OUTPATIENT)
Dept: FAMILY MEDICINE | Facility: CLINIC | Age: 86
End: 2021-04-08

## 2021-06-08 ENCOUNTER — TELEPHONE (OUTPATIENT)
Dept: FAMILY MEDICINE | Facility: CLINIC | Age: 86
End: 2021-06-08

## 2021-06-08 RX ORDER — SITAGLIPTIN 50 MG/1
TABLET, FILM COATED ORAL
Qty: 30 TABLET | Refills: 10 | OUTPATIENT
Start: 2021-06-08

## 2021-06-10 ENCOUNTER — HOSPITAL ENCOUNTER (OUTPATIENT)
Dept: RADIOLOGY | Facility: HOSPITAL | Age: 86
Discharge: HOME | End: 2021-06-10
Attending: REGISTERED NURSE
Payer: MEDICARE

## 2021-06-10 ENCOUNTER — TELEPHONE (OUTPATIENT)
Dept: FAMILY MEDICINE | Facility: CLINIC | Age: 86
End: 2021-06-10

## 2021-06-10 ENCOUNTER — OFFICE VISIT (OUTPATIENT)
Dept: FAMILY MEDICINE | Facility: CLINIC | Age: 86
End: 2021-06-10
Payer: MEDICARE

## 2021-06-10 VITALS
TEMPERATURE: 98 F | SYSTOLIC BLOOD PRESSURE: 120 MMHG | OXYGEN SATURATION: 96 % | DIASTOLIC BLOOD PRESSURE: 70 MMHG | RESPIRATION RATE: 18 BRPM | HEIGHT: 64 IN | BODY MASS INDEX: 25.35 KG/M2 | HEART RATE: 69 BPM

## 2021-06-10 DIAGNOSIS — M81.0 OSTEOPOROSIS, UNSPECIFIED OSTEOPOROSIS TYPE, UNSPECIFIED PATHOLOGICAL FRACTURE PRESENCE: ICD-10-CM

## 2021-06-10 DIAGNOSIS — M25.552 HIP PAIN, ACUTE, LEFT: Primary | ICD-10-CM

## 2021-06-10 DIAGNOSIS — M25.552 HIP PAIN, ACUTE, LEFT: ICD-10-CM

## 2021-06-10 DIAGNOSIS — M79.652 ACUTE PAIN OF LEFT THIGH: ICD-10-CM

## 2021-06-10 PROCEDURE — 99213 OFFICE O/P EST LOW 20 MIN: CPT | Mod: S$GLB,,, | Performed by: REGISTERED NURSE

## 2021-06-10 PROCEDURE — 73502 X-RAY EXAM HIP UNI 2-3 VIEWS: CPT | Mod: TC,FY,PO,LT

## 2021-06-10 PROCEDURE — 99999 PR PBB SHADOW E&M-EST. PATIENT-LVL V: CPT | Mod: PBBFAC,,, | Performed by: REGISTERED NURSE

## 2021-06-10 PROCEDURE — 99999 PR PBB SHADOW E&M-EST. PATIENT-LVL V: ICD-10-PCS | Mod: PBBFAC,,, | Performed by: REGISTERED NURSE

## 2021-06-10 PROCEDURE — 73552 X-RAY EXAM OF FEMUR 2/>: CPT | Mod: 26,LT,, | Performed by: RADIOLOGY

## 2021-06-10 PROCEDURE — 1159F MED LIST DOCD IN RCRD: CPT | Mod: S$GLB,,, | Performed by: REGISTERED NURSE

## 2021-06-10 PROCEDURE — 1159F PR MEDICATION LIST DOCUMENTED IN MEDICAL RECORD: ICD-10-PCS | Mod: S$GLB,,, | Performed by: REGISTERED NURSE

## 2021-06-10 PROCEDURE — 3288F FALL RISK ASSESSMENT DOCD: CPT | Mod: CPTII,S$GLB,, | Performed by: REGISTERED NURSE

## 2021-06-10 PROCEDURE — 73552 X-RAY EXAM OF FEMUR 2/>: CPT | Mod: TC,FY,PO,LT

## 2021-06-10 PROCEDURE — 73552 XR FEMUR 2 VIEW LEFT: ICD-10-PCS | Mod: 26,LT,, | Performed by: RADIOLOGY

## 2021-06-10 PROCEDURE — 1101F PT FALLS ASSESS-DOCD LE1/YR: CPT | Mod: CPTII,S$GLB,, | Performed by: REGISTERED NURSE

## 2021-06-10 PROCEDURE — 73502 XR HIP WITH PELVIS WHEN PERFORMED, 2 OR 3 VIEWS LEFT: ICD-10-PCS | Mod: 26,LT,, | Performed by: RADIOLOGY

## 2021-06-10 PROCEDURE — 1125F PR PAIN SEVERITY QUANTIFIED, PAIN PRESENT: ICD-10-PCS | Mod: S$GLB,,, | Performed by: REGISTERED NURSE

## 2021-06-10 PROCEDURE — 3288F PR FALLS RISK ASSESSMENT DOCUMENTED: ICD-10-PCS | Mod: CPTII,S$GLB,, | Performed by: REGISTERED NURSE

## 2021-06-10 PROCEDURE — 1101F PR PT FALLS ASSESS DOC 0-1 FALLS W/OUT INJ PAST YR: ICD-10-PCS | Mod: CPTII,S$GLB,, | Performed by: REGISTERED NURSE

## 2021-06-10 PROCEDURE — 73502 X-RAY EXAM HIP UNI 2-3 VIEWS: CPT | Mod: 26,LT,, | Performed by: RADIOLOGY

## 2021-06-10 PROCEDURE — 1125F AMNT PAIN NOTED PAIN PRSNT: CPT | Mod: S$GLB,,, | Performed by: REGISTERED NURSE

## 2021-06-10 PROCEDURE — 99213 PR OFFICE/OUTPT VISIT, EST, LEVL III, 20-29 MIN: ICD-10-PCS | Mod: S$GLB,,, | Performed by: REGISTERED NURSE

## 2021-06-10 RX ORDER — ACETAMINOPHEN 500 MG
500 TABLET ORAL EVERY 6 HOURS PRN
COMMUNITY

## 2021-06-10 RX ORDER — L. ACIDOPHILUS/L.BULGARICUS 100MM CELL
1 GRANULES IN PACKET (EA) ORAL 2 TIMES DAILY
COMMUNITY

## 2021-06-10 RX ORDER — LANOLIN ALCOHOL/MO/W.PET/CERES
1 CREAM (GRAM) TOPICAL 2 TIMES DAILY
COMMUNITY

## 2021-06-10 RX ORDER — POLYETHYLENE GLYCOL 3350 17 G/17G
POWDER, FOR SOLUTION ORAL
COMMUNITY

## 2021-06-10 RX ORDER — HYDROCODONE BITARTRATE AND ACETAMINOPHEN 5; 325 MG/1; MG/1
1 TABLET ORAL EVERY 6 HOURS PRN
COMMUNITY
End: 2021-12-01

## 2021-06-11 ENCOUNTER — TELEPHONE (OUTPATIENT)
Dept: FAMILY MEDICINE | Facility: CLINIC | Age: 86
End: 2021-06-11

## 2021-06-17 ENCOUNTER — PATIENT OUTREACH (OUTPATIENT)
Dept: ADMINISTRATIVE | Facility: HOSPITAL | Age: 86
End: 2021-06-17

## 2021-06-17 DIAGNOSIS — E11.22 TYPE 2 DIABETES MELLITUS WITH STAGE 3B CHRONIC KIDNEY DISEASE, WITHOUT LONG-TERM CURRENT USE OF INSULIN: Primary | ICD-10-CM

## 2021-06-17 DIAGNOSIS — N18.32 TYPE 2 DIABETES MELLITUS WITH STAGE 3B CHRONIC KIDNEY DISEASE, WITHOUT LONG-TERM CURRENT USE OF INSULIN: Primary | ICD-10-CM

## 2021-06-18 ENCOUNTER — OFFICE VISIT (OUTPATIENT)
Dept: FAMILY MEDICINE | Facility: CLINIC | Age: 86
End: 2021-06-18
Payer: MEDICARE

## 2021-06-18 ENCOUNTER — LAB VISIT (OUTPATIENT)
Dept: LAB | Facility: HOSPITAL | Age: 86
End: 2021-06-18
Attending: FAMILY MEDICINE
Payer: MEDICARE

## 2021-06-18 VITALS
RESPIRATION RATE: 13 BRPM | HEART RATE: 62 BPM | BODY MASS INDEX: 25.35 KG/M2 | SYSTOLIC BLOOD PRESSURE: 128 MMHG | OXYGEN SATURATION: 96 % | DIASTOLIC BLOOD PRESSURE: 68 MMHG | TEMPERATURE: 98 F | HEIGHT: 64 IN

## 2021-06-18 DIAGNOSIS — Z00.00 PREVENTATIVE HEALTH CARE: Primary | ICD-10-CM

## 2021-06-18 DIAGNOSIS — E78.5 HYPERLIPIDEMIA ASSOCIATED WITH TYPE 2 DIABETES MELLITUS: Chronic | ICD-10-CM

## 2021-06-18 DIAGNOSIS — M79.605 LEFT LEG PAIN: ICD-10-CM

## 2021-06-18 DIAGNOSIS — E11.59 HYPERTENSION ASSOCIATED WITH DIABETES: Chronic | ICD-10-CM

## 2021-06-18 DIAGNOSIS — N18.32 TYPE 2 DIABETES MELLITUS WITH STAGE 3B CHRONIC KIDNEY DISEASE, WITHOUT LONG-TERM CURRENT USE OF INSULIN: ICD-10-CM

## 2021-06-18 DIAGNOSIS — E11.22 TYPE 2 DIABETES MELLITUS WITH STAGE 3B CHRONIC KIDNEY DISEASE, WITHOUT LONG-TERM CURRENT USE OF INSULIN: ICD-10-CM

## 2021-06-18 DIAGNOSIS — E11.69 HYPERLIPIDEMIA ASSOCIATED WITH TYPE 2 DIABETES MELLITUS: Chronic | ICD-10-CM

## 2021-06-18 DIAGNOSIS — I15.2 HYPERTENSION ASSOCIATED WITH DIABETES: Chronic | ICD-10-CM

## 2021-06-18 PROCEDURE — 80053 COMPREHEN METABOLIC PANEL: CPT | Performed by: FAMILY MEDICINE

## 2021-06-18 PROCEDURE — 1125F AMNT PAIN NOTED PAIN PRSNT: CPT | Mod: S$GLB,,, | Performed by: FAMILY MEDICINE

## 2021-06-18 PROCEDURE — 3288F PR FALLS RISK ASSESSMENT DOCUMENTED: ICD-10-PCS | Mod: CPTII,S$GLB,, | Performed by: FAMILY MEDICINE

## 2021-06-18 PROCEDURE — 99999 PR PBB SHADOW E&M-EST. PATIENT-LVL V: ICD-10-PCS | Mod: PBBFAC,,, | Performed by: FAMILY MEDICINE

## 2021-06-18 PROCEDURE — 99999 PR PBB SHADOW E&M-EST. PATIENT-LVL V: CPT | Mod: PBBFAC,,, | Performed by: FAMILY MEDICINE

## 2021-06-18 PROCEDURE — 99397 PER PM REEVAL EST PAT 65+ YR: CPT | Mod: S$GLB,,, | Performed by: FAMILY MEDICINE

## 2021-06-18 PROCEDURE — 85025 COMPLETE CBC W/AUTO DIFF WBC: CPT | Performed by: FAMILY MEDICINE

## 2021-06-18 PROCEDURE — 36415 COLL VENOUS BLD VENIPUNCTURE: CPT | Mod: PO | Performed by: FAMILY MEDICINE

## 2021-06-18 PROCEDURE — 1101F PT FALLS ASSESS-DOCD LE1/YR: CPT | Mod: CPTII,S$GLB,, | Performed by: FAMILY MEDICINE

## 2021-06-18 PROCEDURE — 80061 LIPID PANEL: CPT | Performed by: FAMILY MEDICINE

## 2021-06-18 PROCEDURE — 1125F PR PAIN SEVERITY QUANTIFIED, PAIN PRESENT: ICD-10-PCS | Mod: S$GLB,,, | Performed by: FAMILY MEDICINE

## 2021-06-18 PROCEDURE — 3288F FALL RISK ASSESSMENT DOCD: CPT | Mod: CPTII,S$GLB,, | Performed by: FAMILY MEDICINE

## 2021-06-18 PROCEDURE — 1101F PR PT FALLS ASSESS DOC 0-1 FALLS W/OUT INJ PAST YR: ICD-10-PCS | Mod: CPTII,S$GLB,, | Performed by: FAMILY MEDICINE

## 2021-06-18 PROCEDURE — 99397 PR PREVENTIVE VISIT,EST,65 & OVER: ICD-10-PCS | Mod: S$GLB,,, | Performed by: FAMILY MEDICINE

## 2021-06-18 PROCEDURE — 83036 HEMOGLOBIN GLYCOSYLATED A1C: CPT | Performed by: FAMILY MEDICINE

## 2021-06-18 RX ORDER — TRAMADOL HYDROCHLORIDE 50 MG/1
50-100 TABLET ORAL EVERY 6 HOURS PRN
Qty: 40 TABLET | Refills: 0 | Status: SHIPPED | OUTPATIENT
Start: 2021-06-18 | End: 2021-06-28

## 2021-06-19 LAB
ALBUMIN SERPL BCP-MCNC: 4.2 G/DL (ref 3.5–5.2)
ALP SERPL-CCNC: 58 U/L (ref 55–135)
ALT SERPL W/O P-5'-P-CCNC: 9 U/L (ref 10–44)
ANION GAP SERPL CALC-SCNC: 13 MMOL/L (ref 8–16)
AST SERPL-CCNC: 14 U/L (ref 10–40)
BASOPHILS # BLD AUTO: 0.08 K/UL (ref 0–0.2)
BASOPHILS NFR BLD: 1.1 % (ref 0–1.9)
BILIRUB SERPL-MCNC: 0.3 MG/DL (ref 0.1–1)
BUN SERPL-MCNC: 17 MG/DL (ref 10–30)
CALCIUM SERPL-MCNC: 9.5 MG/DL (ref 8.7–10.5)
CHLORIDE SERPL-SCNC: 110 MMOL/L (ref 95–110)
CHOLEST SERPL-MCNC: 171 MG/DL (ref 120–199)
CHOLEST/HDLC SERPL: 3.4 {RATIO} (ref 2–5)
CO2 SERPL-SCNC: 18 MMOL/L (ref 23–29)
CREAT SERPL-MCNC: 1.1 MG/DL (ref 0.5–1.4)
DIFFERENTIAL METHOD: ABNORMAL
EOSINOPHIL # BLD AUTO: 0.3 K/UL (ref 0–0.5)
EOSINOPHIL NFR BLD: 4.2 % (ref 0–8)
ERYTHROCYTE [DISTWIDTH] IN BLOOD BY AUTOMATED COUNT: 13.9 % (ref 11.5–14.5)
EST. GFR  (AFRICAN AMERICAN): 50.4 ML/MIN/1.73 M^2
EST. GFR  (NON AFRICAN AMERICAN): 43.7 ML/MIN/1.73 M^2
ESTIMATED AVG GLUCOSE: 117 MG/DL (ref 68–131)
GLUCOSE SERPL-MCNC: 122 MG/DL (ref 70–110)
HBA1C MFR BLD: 5.7 % (ref 4–5.6)
HCT VFR BLD AUTO: 42.1 % (ref 37–48.5)
HDLC SERPL-MCNC: 50 MG/DL (ref 40–75)
HDLC SERPL: 29.2 % (ref 20–50)
HGB BLD-MCNC: 13.3 G/DL (ref 12–16)
IMM GRANULOCYTES # BLD AUTO: 0.02 K/UL (ref 0–0.04)
IMM GRANULOCYTES NFR BLD AUTO: 0.3 % (ref 0–0.5)
LDLC SERPL CALC-MCNC: 90 MG/DL (ref 63–159)
LYMPHOCYTES # BLD AUTO: 1.8 K/UL (ref 1–4.8)
LYMPHOCYTES NFR BLD: 23.6 % (ref 18–48)
MCH RBC QN AUTO: 31.4 PG (ref 27–31)
MCHC RBC AUTO-ENTMCNC: 31.6 G/DL (ref 32–36)
MCV RBC AUTO: 99 FL (ref 82–98)
MONOCYTES # BLD AUTO: 0.8 K/UL (ref 0.3–1)
MONOCYTES NFR BLD: 10.5 % (ref 4–15)
NEUTROPHILS # BLD AUTO: 4.6 K/UL (ref 1.8–7.7)
NEUTROPHILS NFR BLD: 60.3 % (ref 38–73)
NONHDLC SERPL-MCNC: 121 MG/DL
NRBC BLD-RTO: 0 /100 WBC
PLATELET # BLD AUTO: 254 K/UL (ref 150–450)
PMV BLD AUTO: 12.1 FL (ref 9.2–12.9)
POTASSIUM SERPL-SCNC: 4.1 MMOL/L (ref 3.5–5.1)
PROT SERPL-MCNC: 7.1 G/DL (ref 6–8.4)
RBC # BLD AUTO: 4.24 M/UL (ref 4–5.4)
SODIUM SERPL-SCNC: 141 MMOL/L (ref 136–145)
TRIGL SERPL-MCNC: 155 MG/DL (ref 30–150)
WBC # BLD AUTO: 7.6 K/UL (ref 3.9–12.7)

## 2021-06-21 ENCOUNTER — HOSPITAL ENCOUNTER (OUTPATIENT)
Dept: RADIOLOGY | Facility: HOSPITAL | Age: 86
Discharge: HOME | End: 2021-06-21
Attending: FAMILY MEDICINE
Payer: MEDICARE

## 2021-06-21 DIAGNOSIS — M79.605 LEFT LEG PAIN: ICD-10-CM

## 2021-06-21 PROCEDURE — 93971 EXTREMITY STUDY: CPT | Mod: TC,LT

## 2021-06-21 PROCEDURE — 93971 US LOWER EXTREMITY VEINS LEFT: ICD-10-PCS | Mod: 26,LT,, | Performed by: RADIOLOGY

## 2021-06-21 PROCEDURE — 93971 EXTREMITY STUDY: CPT | Mod: 26,LT,, | Performed by: RADIOLOGY

## 2021-06-21 RX ORDER — AMLODIPINE BESYLATE 2.5 MG/1
TABLET ORAL
Qty: 30 TABLET | Refills: 11 | Status: SHIPPED | OUTPATIENT
Start: 2021-06-21

## 2021-06-24 ENCOUNTER — TELEPHONE (OUTPATIENT)
Dept: FAMILY MEDICINE | Facility: CLINIC | Age: 86
End: 2021-06-24

## 2021-06-24 DIAGNOSIS — M25.552 HIP PAIN, ACUTE, LEFT: Primary | ICD-10-CM

## 2021-06-24 DIAGNOSIS — M79.605 LEFT LEG PAIN: ICD-10-CM

## 2021-06-25 RX ORDER — SITAGLIPTIN 50 MG/1
TABLET, FILM COATED ORAL
Qty: 30 TABLET | Refills: 11 | Status: SHIPPED | OUTPATIENT
Start: 2021-06-25

## 2021-06-30 RX ORDER — BUPROPION HYDROCHLORIDE 150 MG/1
TABLET, EXTENDED RELEASE ORAL
Qty: 60 TABLET | Refills: 11 | Status: SHIPPED | OUTPATIENT
Start: 2021-06-30

## 2021-07-16 RX ORDER — TRAMADOL HYDROCHLORIDE 50 MG/1
50 TABLET ORAL EVERY 6 HOURS PRN
Qty: 40 TABLET | Refills: 5 | Status: SHIPPED | OUTPATIENT
Start: 2021-07-16

## 2021-07-20 RX ORDER — INSULIN GLARGINE 100 [IU]/ML
INJECTION, SOLUTION SUBCUTANEOUS
Qty: 6 ML | Refills: 11 | Status: SHIPPED | OUTPATIENT
Start: 2021-07-20 | End: 2021-12-01

## 2021-10-25 ENCOUNTER — TELEPHONE (OUTPATIENT)
Dept: FAMILY MEDICINE | Facility: CLINIC | Age: 86
End: 2021-10-25
Payer: MEDICARE

## 2021-11-19 ENCOUNTER — TELEPHONE (OUTPATIENT)
Dept: FAMILY MEDICINE | Facility: CLINIC | Age: 86
End: 2021-11-19
Payer: MEDICARE

## 2021-11-19 RX ORDER — PROMETHAZINE HYDROCHLORIDE AND DEXTROMETHORPHAN HYDROBROMIDE 6.25; 15 MG/5ML; MG/5ML
5 SYRUP ORAL EVERY 6 HOURS PRN
Qty: 180 ML | Refills: 0 | Status: SHIPPED | OUTPATIENT
Start: 2021-11-19 | End: 2021-11-29

## 2021-11-20 ENCOUNTER — NURSE TRIAGE (OUTPATIENT)
Dept: ADMINISTRATIVE | Facility: CLINIC | Age: 86
End: 2021-11-20
Payer: MEDICARE

## 2021-11-22 ENCOUNTER — PATIENT OUTREACH (OUTPATIENT)
Dept: ADMINISTRATIVE | Facility: HOSPITAL | Age: 86
End: 2021-11-22
Payer: MEDICARE

## 2021-11-23 ENCOUNTER — EXTERNAL HOSPITAL ADMISSION (OUTPATIENT)
Dept: ADMINISTRATIVE | Facility: CLINIC | Age: 86
End: 2021-11-23
Payer: MEDICARE

## 2021-11-23 ENCOUNTER — PATIENT OUTREACH (OUTPATIENT)
Dept: ADMINISTRATIVE | Facility: CLINIC | Age: 86
End: 2021-11-23
Payer: MEDICARE

## 2021-11-29 ENCOUNTER — PATIENT OUTREACH (OUTPATIENT)
Dept: ADMINISTRATIVE | Facility: HOSPITAL | Age: 86
End: 2021-11-29
Payer: MEDICARE

## 2021-11-30 ENCOUNTER — OFFICE VISIT (OUTPATIENT)
Dept: FAMILY MEDICINE | Facility: CLINIC | Age: 86
End: 2021-11-30
Payer: MEDICARE

## 2021-11-30 VITALS
WEIGHT: 161.81 LBS | HEART RATE: 70 BPM | RESPIRATION RATE: 13 BRPM | SYSTOLIC BLOOD PRESSURE: 118 MMHG | DIASTOLIC BLOOD PRESSURE: 78 MMHG | BODY MASS INDEX: 27.63 KG/M2 | OXYGEN SATURATION: 97 % | TEMPERATURE: 98 F | HEIGHT: 64 IN

## 2021-11-30 DIAGNOSIS — E11.9 TYPE 2 DIABETES MELLITUS WITHOUT COMPLICATION, WITH LONG-TERM CURRENT USE OF INSULIN: ICD-10-CM

## 2021-11-30 DIAGNOSIS — E11.59 HYPERTENSION ASSOCIATED WITH DIABETES: Chronic | ICD-10-CM

## 2021-11-30 DIAGNOSIS — Z79.4 TYPE 2 DIABETES MELLITUS WITHOUT COMPLICATION, WITH LONG-TERM CURRENT USE OF INSULIN: ICD-10-CM

## 2021-11-30 DIAGNOSIS — K52.9 CHRONIC DIARRHEA: ICD-10-CM

## 2021-11-30 DIAGNOSIS — I15.2 HYPERTENSION ASSOCIATED WITH DIABETES: Chronic | ICD-10-CM

## 2021-11-30 DIAGNOSIS — J18.9 PNEUMONIA OF LEFT LOWER LOBE DUE TO INFECTIOUS ORGANISM: Primary | ICD-10-CM

## 2021-11-30 DIAGNOSIS — N18.30 CHRONIC KIDNEY DISEASE (CKD), ACTIVE MEDICAL MANAGEMENT WITHOUT DIALYSIS, STAGE 3 (MODERATE): ICD-10-CM

## 2021-11-30 PROCEDURE — 99495 TRANSJ CARE MGMT MOD F2F 14D: CPT | Mod: HCNC,S$GLB,, | Performed by: FAMILY MEDICINE

## 2021-11-30 PROCEDURE — 99999 PR PBB SHADOW E&M-EST. PATIENT-LVL V: ICD-10-PCS | Mod: PBBFAC,HCNC,, | Performed by: FAMILY MEDICINE

## 2021-11-30 PROCEDURE — 99499 RISK ADDL DX/OHS AUDIT: ICD-10-PCS | Mod: HCNC,S$GLB,, | Performed by: FAMILY MEDICINE

## 2021-11-30 PROCEDURE — 99499 UNLISTED E&M SERVICE: CPT | Mod: HCNC,S$GLB,, | Performed by: FAMILY MEDICINE

## 2021-11-30 PROCEDURE — 99999 PR PBB SHADOW E&M-EST. PATIENT-LVL V: CPT | Mod: PBBFAC,HCNC,, | Performed by: FAMILY MEDICINE

## 2021-11-30 PROCEDURE — 99495 TCM SERVICES (MODERATE COMPLEXITY): ICD-10-PCS | Mod: HCNC,S$GLB,, | Performed by: FAMILY MEDICINE

## 2021-12-01 RX ORDER — INSULIN ASPART 100 [IU]/ML
5 INJECTION, SOLUTION INTRAVENOUS; SUBCUTANEOUS
Qty: 10 ML | Refills: 10
Start: 2021-12-01

## 2021-12-01 RX ORDER — INSULIN GLARGINE 100 [IU]/ML
15 INJECTION, SOLUTION SUBCUTANEOUS NIGHTLY
Qty: 6 ML | Refills: 11
Start: 2021-12-01

## 2021-12-14 RX ORDER — NITROFURANTOIN 25; 75 MG/1; MG/1
100 CAPSULE ORAL 2 TIMES DAILY
Qty: 14 CAPSULE | Refills: 0 | Status: SHIPPED | OUTPATIENT
Start: 2021-12-14 | End: 2022-01-10

## 2022-01-03 ENCOUNTER — TELEPHONE (OUTPATIENT)
Dept: FAMILY MEDICINE | Facility: CLINIC | Age: 87
End: 2022-01-03
Payer: MEDICARE

## 2022-01-03 NOTE — TELEPHONE ENCOUNTER
----- Message from Jessica Gabriel sent at 1/3/2022 12:18 PM CST -----  Contact: [phone] [facility] States patient tested positive for Covid yesterday. Patient's family is requesting a chest x-ray. Patient is not showing symptoms of pneumonia, family just wants to make sure. Please call and advise    
Can they check her oxygen? How are they going to transport her for a chest x-ray?  
OK chest x-ray. Also see if they want to do the infusion.  
Spoke to Eda, [facility] states the [facility] nurse noticed patient have a dry cough and that is why she swabbed nurse and she was positive. [facility] states patient has no symptoms besides the dry cough, in which she never heard and she was with patient approx 45 minutes yesterday. Patient has no sitters that are able to come. [facility] states the family was requesting the chest Xray and if you did order the infusion, unless patient will transport, they are unable to get patient to infusion. Please advise. /mimi/  
Spoke to Precious, she is going to advise the family that the Xray had been ordered. Mrs. Lang spoke to patients daughter, Snehal about the infusion and they declined at this time. /mimi/  
Spoke to patients caregiver, Precious ([phone]). Per Mrs. Lang patient did not have a dry cough, she had a horrible rattling cough, same as she had in November when she was diagnosed with Pneumonia. Also, Mrs. Lang states patient had been complaining of severe back pain the past week, prior to them knowing patient was Covid positive. The reason the family is asking for the Chest Xray is due to the rattling of the cough and her previously having pneumonia. Also, if you believe patient needs chest xray, can you order it to be done at facility patient is residing in? /mimi/  
What are her symptoms? What is her O2 sat? She doesn't need a chest x-ray. She needs antibody infusion.  
negative

## 2022-01-10 ENCOUNTER — TELEPHONE (OUTPATIENT)
Dept: FAMILY MEDICINE | Facility: CLINIC | Age: 87
End: 2022-01-10
Payer: MEDICARE

## 2022-01-10 RX ORDER — MELOXICAM 15 MG/1
15 TABLET ORAL DAILY
Qty: 30 TABLET | Refills: 2 | Status: SHIPPED | OUTPATIENT
Start: 2022-01-10

## 2022-01-10 NOTE — TELEPHONE ENCOUNTER
I send script for meloxicam to pharmacy which is an anti-inflammatory. Take with food. Consider physical therapy. What is she requesting I increase Tramadol to? How often is she taking it?

## 2022-01-10 NOTE — TELEPHONE ENCOUNTER
Attempted to contact patients daughter, Cynthia to advise medication had been sent to Rx. Cynthia did not answer, spoke to Mrs. Lang, patient caregiver and advised her. /mimi/

## 2022-01-10 NOTE — TELEPHONE ENCOUNTER
Spoke to patients daughter, Cynthia. Cynthia states that her mother has been having back pain for quite awhile, over the weekend the back pain got more severe. She has been taking her Tramadol and Tylenol, neither is helping as much anymore. Cynthia states years ago, they stopped giving patient Advil due to possibly liver issues. Cynthia states she would like to know if provider believes it would be okay if patient alternates taking Advil and Tylenol, Advil worked in the past much better than Tylenol for patient. Also, Cynthia would like to know if provider would increase patients Tramadol? Prescriptions should be sent to [facility]. /mimi/                    ----- Message from Soco Fernandez sent at 1/10/2022 10:21 AM CST -----  .Type:  Needs Medical Advice    Who Called: Cynthia Ford(pr's daughter)  Symptoms (please be specific): pain   How long has patient had these symptoms:  on-going  Pharmacy name and phone #:    Would the patient rather a call back or a response via [facility]? Call back  Best Call Back Number: [phone]  Additional Information: would like to discus options for treatment for pain, pain has gotten worse.

## 2022-01-14 ENCOUNTER — TELEPHONE (OUTPATIENT)
Dept: FAMILY MEDICINE | Facility: CLINIC | Age: 87
End: 2022-01-14
Payer: MEDICARE

## 2022-01-14 NOTE — TELEPHONE ENCOUNTER
Spoke to patients daughter, Snehal, advised her I did get the paperwork and that I would have that filled out and faxed back by next week, Snehal verbalized understanding. /mimi/            ----- Message from Katelyn Barnard sent at 1/14/2022 11:21 AM CST -----  Contact: pt daughter  .Type:  Needs Medical Advice    Who Called:  pt daughter    Symptoms (please be specific):  How long has patient had these symptoms:   Pharmacy name and phone #:   Would the patient rather a call back or a response via [facility]?  Call    Best Call Back Number: [phone]  Additional Information:  Caller is requesting  a call back from the nurse in regards to the caller knowing  if the staff has received an request for the pt medical records from [facility] so that the pt can be placed in  a nursing  home please

## 2022-01-18 ENCOUNTER — TELEPHONE (OUTPATIENT)
Dept: FAMILY MEDICINE | Facility: CLINIC | Age: 87
End: 2022-01-18
Payer: MEDICARE

## 2022-01-18 NOTE — TELEPHONE ENCOUNTER
Attempted to return call to [facility], did not answer, left message. /mimi/      ----- Message from Lauren Walker sent at 1/18/2022 12:22 PM CST -----  Contact: [facility]/ [facility] is needing a call back regarding getting orders for the patient to have a lumbar X Ray done. Please call her back at [phone]

## 2022-01-19 ENCOUNTER — TELEPHONE (OUTPATIENT)
Dept: FAMILY MEDICINE | Facility: CLINIC | Age: 87
End: 2022-01-19
Payer: MEDICARE

## 2022-01-19 DIAGNOSIS — S39.92XA INJURY OF BACK, INITIAL ENCOUNTER: Primary | ICD-10-CM

## 2022-01-19 NOTE — TELEPHONE ENCOUNTER
She had a fall a couple of weeks ago, it is the lower part of her back is what I was told. Radha and Eda are nurses at [facility]. Patient has been stating she has been in pain, but, this first began when patient got diagnosed with Covid. This is why you ordered Mobic on 01.10.2022. /mimi/

## 2022-01-19 NOTE — TELEPHONE ENCOUNTER
Why? Has she fallen? Previous back x-ray shows degenerative disease. Why would it show us any difference?  Who is Radha- a nurse, a physician? What part of back? How long has she been complaining? What has she tried taking? She may need to be seen.

## 2022-01-19 NOTE — TELEPHONE ENCOUNTER
Can you place this order?       ----- Message from Julia Navarro sent at 1/19/2022  1:38 PM CST -----  Regarding: mobile xray on lower back  Contact: [facility]  Ms Garcia is requesting a mobile xray order for patient with severe back pain, please fax to [phone], please call her back if needed at [phone]

## 2022-01-20 ENCOUNTER — TELEPHONE (OUTPATIENT)
Dept: FAMILY MEDICINE | Facility: CLINIC | Age: 87
End: 2022-01-20
Payer: MEDICARE

## 2022-01-20 NOTE — TELEPHONE ENCOUNTER
Spoke to Michael at [facility], acknowledged to him I did receive the paperwork for patient to transfer to other unit, explained that provider is out this week and I would have her sign it on Monday and fax it back to him. /mimi/            ----- Message from Radha Ovalles sent at 1/20/2022 12:41 PM CST -----  Contact: Michael([facility])  Michael called to consult wit nurse or staff regarding patient orders for care. Michael would like a call back and can be reached at [phone]. Thanks/MR

## 2022-01-28 ENCOUNTER — DOCUMENT SCAN (OUTPATIENT)
Dept: HOME HEALTH SERVICES | Facility: HOSPITAL | Age: 87
End: 2022-01-28
Payer: MEDICARE

## 2022-01-28 ENCOUNTER — TELEPHONE (OUTPATIENT)
Dept: FAMILY MEDICINE | Facility: CLINIC | Age: 87
End: 2022-01-28
Payer: MEDICARE

## 2022-01-28 NOTE — TELEPHONE ENCOUNTER
Spoke to Michael, advised that I was working on paperwork today, that we had been shorthanded but I am in the process of having the paperwork finished and would fax. /mimi/          ----- Message from Lisa Cleveland sent at 1/28/2022 10:46 AM CST -----  Contact: Michael Rodriguez would like a call back at [phone], Regards to getting her admission orders.    Thanks  Td

## 2022-01-31 ENCOUNTER — TELEPHONE (OUTPATIENT)
Dept: FAMILY MEDICINE | Facility: CLINIC | Age: 87
End: 2022-01-31
Payer: MEDICARE

## 2022-01-31 RX ORDER — AZELASTINE HYDROCHLORIDE 0.5 MG/ML
1 SOLUTION/ DROPS OPHTHALMIC 2 TIMES DAILY
Qty: 4 ML | Refills: 11 | Status: SHIPPED | OUTPATIENT
Start: 2022-01-31 | End: 2022-02-01 | Stop reason: SDUPTHER

## 2022-02-01 ENCOUNTER — TELEPHONE (OUTPATIENT)
Dept: FAMILY MEDICINE | Facility: CLINIC | Age: 87
End: 2022-02-01
Payer: MEDICARE

## 2022-02-01 ENCOUNTER — DOCUMENT SCAN (OUTPATIENT)
Dept: HOME HEALTH SERVICES | Facility: HOSPITAL | Age: 87
End: 2022-02-01
Payer: MEDICARE

## 2022-02-01 RX ORDER — AZELASTINE HYDROCHLORIDE 0.5 MG/ML
1 SOLUTION/ DROPS OPHTHALMIC 2 TIMES DAILY
Qty: 4 ML | Refills: 3 | Status: SHIPPED | OUTPATIENT
Start: 2022-02-01 | End: 2022-06-01

## 2022-02-01 NOTE — TELEPHONE ENCOUNTER
Rx for eye drops was prescribed yesterday but sent to incorrect pharmacy, Patient is needing Rx to go to [facility] in [place]. /mimi/

## 2022-02-01 NOTE — TELEPHONE ENCOUNTER
"Attempted to return call to Radha at [facility], the recording came straight on, phone did not ring, recording stated I was contacting the "Medication Management room" I left voicemail for Radha advising medication was sent to pharmacy yesterday evening. /mimi/            ----- Message from Kaleigh Arnold sent at 2/1/2022  2:50 PM CST -----  Contact: Radha Turner  .Type:  Needs Medical Advice    Who Called: [facility] Radha   Symptoms (please be specific): itchy eyes swollen   How long has patient had these symptoms:  2-3 days   Pharmacy name and phone #: .    [facility] of [address] [address]  Phone: [phone] Fax: [phone]  Would the patient rather a call back or a response via [facility]? Call   Best Call Back Number: [phone]  Additional Information:  Radha from [facility] is requesting eye drops be called in for the pt she has red itchy swollen eyes that she has been dealing with for about 3 days now with the systems not getting any better       "

## 2022-02-08 ENCOUNTER — TELEPHONE (OUTPATIENT)
Dept: FAMILY MEDICINE | Facility: CLINIC | Age: 87
End: 2022-02-08
Payer: MEDICARE

## 2022-02-08 NOTE — TELEPHONE ENCOUNTER
Spoke to patients daughter, Cynthia. Patient needs face to face prior to moving into nursing home. Scheduled appt with PCP on 02.17.2022 at 1120am. /mimi/          ----- Message from Birgit Howard sent at 2/8/2022  1:03 PM CST -----  Contact: Cynthia  .Type:  Sooner Apoointment Request    Caller is requesting a sooner appointment.  Caller declined first available appointment listed below.  Caller will not accept being placed on the waitlist and is requesting a message be sent to doctor.  Name of Caller:Cynthia  When is the first available appointment?03/21  Symptoms:physical before moving into nursing home  Would the patient rather a call back or a response via [facility]? Call back   Best Call Back Number:[phone]  Additional Information: n/a            Thanks  DD

## 2022-02-08 NOTE — TELEPHONE ENCOUNTER
Spoke to Michael at [facility], he was asking if patient had a more recent progress note, I explained that the 11.30.2021 office note I sent is the most recent one patient has. Michael stated he would see if that would work and let our office know if he needs anything further. /mimi/              ----- Message from Elizabeth Flores sent at 2/8/2022 11:51 AM CST -----  Please call Michael/[facility] @ [phone] regarding pt admit, pt is being admit tomorrow.

## 2022-02-08 NOTE — TELEPHONE ENCOUNTER
Duplicate. /mimi/      ----- Message from Tere Hanson sent at 2/8/2022 12:41 PM CST -----  Contact: [facility](Michael)[phone]  Mr Rodriguez is call regarding some information for this patient , she is been admitted. Please call him back at [phone]. Thanks/ar

## 2022-09-21 NOTE — TELEPHONE ENCOUNTER
Faxed telephone note to [facility]    Retention Suture Bite Size: 3 mm

## 2023-01-18 ENCOUNTER — TELEPHONE (OUTPATIENT)
Dept: ADMINISTRATIVE | Facility: HOSPITAL | Age: 88
End: 2023-01-18
Payer: MEDICARE